# Patient Record
Sex: FEMALE | NOT HISPANIC OR LATINO | URBAN - METROPOLITAN AREA
[De-identification: names, ages, dates, MRNs, and addresses within clinical notes are randomized per-mention and may not be internally consistent; named-entity substitution may affect disease eponyms.]

---

## 2019-03-23 ENCOUNTER — INPATIENT (INPATIENT)
Facility: HOSPITAL | Age: 64
LOS: 6 days | Discharge: HOME | End: 2019-03-30
Attending: INTERNAL MEDICINE | Admitting: INTERNAL MEDICINE
Payer: MEDICAID

## 2019-03-23 VITALS — WEIGHT: 117.07 LBS | HEIGHT: 64 IN

## 2019-03-23 DIAGNOSIS — D72.828 OTHER ELEVATED WHITE BLOOD CELL COUNT: ICD-10-CM

## 2019-03-23 DIAGNOSIS — E44.0 MODERATE PROTEIN-CALORIE MALNUTRITION: ICD-10-CM

## 2019-03-23 DIAGNOSIS — R74.0 NONSPECIFIC ELEVATION OF LEVELS OF TRANSAMINASE AND LACTIC ACID DEHYDROGENASE [LDH]: ICD-10-CM

## 2019-03-23 DIAGNOSIS — Z98.891 HISTORY OF UTERINE SCAR FROM PREVIOUS SURGERY: Chronic | ICD-10-CM

## 2019-03-23 DIAGNOSIS — C18.9 MALIGNANT NEOPLASM OF COLON, UNSPECIFIED: ICD-10-CM

## 2019-03-23 LAB
ALBUMIN SERPL ELPH-MCNC: 2.5 G/DL — LOW (ref 3.3–5)
ALP SERPL-CCNC: 320 U/L — HIGH (ref 40–120)
ALT FLD-CCNC: 48 U/L — SIGNIFICANT CHANGE UP (ref 12–78)
ANION GAP SERPL CALC-SCNC: 9 MMOL/L — SIGNIFICANT CHANGE UP (ref 5–17)
APPEARANCE UR: CLEAR — SIGNIFICANT CHANGE UP
APTT BLD: 27.5 SEC — SIGNIFICANT CHANGE UP (ref 27.5–36.3)
AST SERPL-CCNC: 220 U/L — HIGH (ref 15–37)
BACTERIA # UR AUTO: ABNORMAL
BASOPHILS # BLD AUTO: 0.04 K/UL — SIGNIFICANT CHANGE UP (ref 0–0.2)
BASOPHILS NFR BLD AUTO: 0.3 % — SIGNIFICANT CHANGE UP (ref 0–2)
BILIRUB SERPL-MCNC: 0.7 MG/DL — SIGNIFICANT CHANGE UP (ref 0.2–1.2)
BILIRUB UR-MCNC: NEGATIVE — SIGNIFICANT CHANGE UP
BUN SERPL-MCNC: 7 MG/DL — SIGNIFICANT CHANGE UP (ref 7–23)
CALCIUM SERPL-MCNC: 9.1 MG/DL — SIGNIFICANT CHANGE UP (ref 8.5–10.1)
CHLORIDE SERPL-SCNC: 99 MMOL/L — SIGNIFICANT CHANGE UP (ref 96–108)
CO2 SERPL-SCNC: 28 MMOL/L — SIGNIFICANT CHANGE UP (ref 22–31)
COLOR SPEC: YELLOW — SIGNIFICANT CHANGE UP
CREAT SERPL-MCNC: 0.52 MG/DL — SIGNIFICANT CHANGE UP (ref 0.5–1.3)
DIFF PNL FLD: ABNORMAL
EOSINOPHIL # BLD AUTO: 0.14 K/UL — SIGNIFICANT CHANGE UP (ref 0–0.5)
EOSINOPHIL NFR BLD AUTO: 1 % — SIGNIFICANT CHANGE UP (ref 0–6)
EPI CELLS # UR: NEGATIVE — SIGNIFICANT CHANGE UP
GLUCOSE SERPL-MCNC: 91 MG/DL — SIGNIFICANT CHANGE UP (ref 70–99)
GLUCOSE UR QL: NEGATIVE MG/DL — SIGNIFICANT CHANGE UP
HCT VFR BLD CALC: 46 % — HIGH (ref 34.5–45)
HGB BLD-MCNC: 14.8 G/DL — SIGNIFICANT CHANGE UP (ref 11.5–15.5)
IMM GRANULOCYTES NFR BLD AUTO: 0.5 % — SIGNIFICANT CHANGE UP (ref 0–1.5)
INR BLD: 1.11 RATIO — SIGNIFICANT CHANGE UP (ref 0.88–1.16)
KETONES UR-MCNC: ABNORMAL
LACTATE SERPL-SCNC: 1 MMOL/L — SIGNIFICANT CHANGE UP (ref 0.7–2)
LEUKOCYTE ESTERASE UR-ACNC: ABNORMAL
LYMPHOCYTES # BLD AUTO: 16.1 % — SIGNIFICANT CHANGE UP (ref 13–44)
LYMPHOCYTES # BLD AUTO: 2.29 K/UL — SIGNIFICANT CHANGE UP (ref 1–3.3)
MCHC RBC-ENTMCNC: 27.3 PG — SIGNIFICANT CHANGE UP (ref 27–34)
MCHC RBC-ENTMCNC: 32.2 GM/DL — SIGNIFICANT CHANGE UP (ref 32–36)
MCV RBC AUTO: 84.7 FL — SIGNIFICANT CHANGE UP (ref 80–100)
MONOCYTES # BLD AUTO: 1.33 K/UL — HIGH (ref 0–0.9)
MONOCYTES NFR BLD AUTO: 9.3 % — SIGNIFICANT CHANGE UP (ref 2–14)
NEUTROPHILS # BLD AUTO: 10.38 K/UL — HIGH (ref 1.8–7.4)
NEUTROPHILS NFR BLD AUTO: 72.8 % — SIGNIFICANT CHANGE UP (ref 43–77)
NITRITE UR-MCNC: NEGATIVE — SIGNIFICANT CHANGE UP
NRBC # BLD: 0 /100 WBCS — SIGNIFICANT CHANGE UP (ref 0–0)
PH UR: 6.5 — SIGNIFICANT CHANGE UP (ref 5–8)
PLATELET # BLD AUTO: 615 K/UL — HIGH (ref 150–400)
POTASSIUM SERPL-MCNC: 4.5 MMOL/L — SIGNIFICANT CHANGE UP (ref 3.5–5.3)
POTASSIUM SERPL-SCNC: 4.5 MMOL/L — SIGNIFICANT CHANGE UP (ref 3.5–5.3)
PROT SERPL-MCNC: 7.1 GM/DL — SIGNIFICANT CHANGE UP (ref 6–8.3)
PROT UR-MCNC: 15 MG/DL
PROTHROM AB SERPL-ACNC: 12.4 SEC — SIGNIFICANT CHANGE UP (ref 10–12.9)
RBC # BLD: 5.43 M/UL — HIGH (ref 3.8–5.2)
RBC # FLD: 14 % — SIGNIFICANT CHANGE UP (ref 10.3–14.5)
RBC CASTS # UR COMP ASSIST: NEGATIVE /HPF — SIGNIFICANT CHANGE UP (ref 0–4)
SODIUM SERPL-SCNC: 136 MMOL/L — SIGNIFICANT CHANGE UP (ref 135–145)
SP GR SPEC: 1.01 — SIGNIFICANT CHANGE UP (ref 1.01–1.02)
UROBILINOGEN FLD QL: 4 MG/DL
WBC # BLD: 14.25 K/UL — HIGH (ref 3.8–10.5)
WBC # FLD AUTO: 14.25 K/UL — HIGH (ref 3.8–10.5)
WBC UR QL: NEGATIVE — SIGNIFICANT CHANGE UP

## 2019-03-23 PROCEDURE — 74177 CT ABD & PELVIS W/CONTRAST: CPT | Mod: 26

## 2019-03-23 PROCEDURE — 99285 EMERGENCY DEPT VISIT HI MDM: CPT

## 2019-03-23 PROCEDURE — 76705 ECHO EXAM OF ABDOMEN: CPT | Mod: 26

## 2019-03-23 RX ORDER — LACTOBACILLUS ACIDOPHILUS 100MM CELL
1 CAPSULE ORAL DAILY
Qty: 0 | Refills: 0 | Status: DISCONTINUED | OUTPATIENT
Start: 2019-03-23 | End: 2019-03-30

## 2019-03-23 RX ORDER — PIPERACILLIN AND TAZOBACTAM 4; .5 G/20ML; G/20ML
3.38 INJECTION, POWDER, LYOPHILIZED, FOR SOLUTION INTRAVENOUS ONCE
Qty: 0 | Refills: 0 | Status: COMPLETED | OUTPATIENT
Start: 2019-03-23 | End: 2019-03-23

## 2019-03-23 RX ORDER — OXYCODONE HYDROCHLORIDE 5 MG/1
5 TABLET ORAL EVERY 6 HOURS
Qty: 0 | Refills: 0 | Status: DISCONTINUED | OUTPATIENT
Start: 2019-03-23 | End: 2019-03-27

## 2019-03-23 RX ORDER — PIPERACILLIN AND TAZOBACTAM 4; .5 G/20ML; G/20ML
3.38 INJECTION, POWDER, LYOPHILIZED, FOR SOLUTION INTRAVENOUS EVERY 8 HOURS
Qty: 0 | Refills: 0 | Status: DISCONTINUED | OUTPATIENT
Start: 2019-03-23 | End: 2019-03-25

## 2019-03-23 RX ORDER — SODIUM CHLORIDE 9 MG/ML
3 INJECTION INTRAMUSCULAR; INTRAVENOUS; SUBCUTANEOUS ONCE
Qty: 0 | Refills: 0 | Status: COMPLETED | OUTPATIENT
Start: 2019-03-23 | End: 2019-03-23

## 2019-03-23 RX ORDER — LANOLIN ALCOHOL/MO/W.PET/CERES
3 CREAM (GRAM) TOPICAL AT BEDTIME
Qty: 0 | Refills: 0 | Status: DISCONTINUED | OUTPATIENT
Start: 2019-03-23 | End: 2019-03-30

## 2019-03-23 RX ORDER — INFLUENZA VIRUS VACCINE 15; 15; 15; 15 UG/.5ML; UG/.5ML; UG/.5ML; UG/.5ML
0.5 SUSPENSION INTRAMUSCULAR ONCE
Qty: 0 | Refills: 0 | Status: COMPLETED | OUTPATIENT
Start: 2019-03-23 | End: 2019-03-30

## 2019-03-23 RX ORDER — SODIUM CHLORIDE 9 MG/ML
1000 INJECTION INTRAMUSCULAR; INTRAVENOUS; SUBCUTANEOUS ONCE
Qty: 0 | Refills: 0 | Status: COMPLETED | OUTPATIENT
Start: 2019-03-23 | End: 2019-03-23

## 2019-03-23 RX ORDER — ENOXAPARIN SODIUM 100 MG/ML
40 INJECTION SUBCUTANEOUS EVERY 24 HOURS
Qty: 0 | Refills: 0 | Status: DISCONTINUED | OUTPATIENT
Start: 2019-03-23 | End: 2019-03-30

## 2019-03-23 RX ORDER — ACETAMINOPHEN 500 MG
650 TABLET ORAL EVERY 6 HOURS
Qty: 0 | Refills: 0 | Status: DISCONTINUED | OUTPATIENT
Start: 2019-03-23 | End: 2019-03-30

## 2019-03-23 RX ADMIN — PIPERACILLIN AND TAZOBACTAM 200 GRAM(S): 4; .5 INJECTION, POWDER, LYOPHILIZED, FOR SOLUTION INTRAVENOUS at 14:37

## 2019-03-23 RX ADMIN — Medication 3 MILLIGRAM(S): at 23:46

## 2019-03-23 RX ADMIN — SODIUM CHLORIDE 3 MILLILITER(S): 9 INJECTION INTRAMUSCULAR; INTRAVENOUS; SUBCUTANEOUS at 14:35

## 2019-03-23 RX ADMIN — ENOXAPARIN SODIUM 40 MILLIGRAM(S): 100 INJECTION SUBCUTANEOUS at 21:48

## 2019-03-23 RX ADMIN — SODIUM CHLORIDE 1000 MILLILITER(S): 9 INJECTION INTRAMUSCULAR; INTRAVENOUS; SUBCUTANEOUS at 14:38

## 2019-03-23 RX ADMIN — PIPERACILLIN AND TAZOBACTAM 25 GRAM(S): 4; .5 INJECTION, POWDER, LYOPHILIZED, FOR SOLUTION INTRAVENOUS at 21:48

## 2019-03-23 NOTE — H&P ADULT - HISTORY OF PRESENT ILLNESS
64 y/o female with no known PMHx that presents to the ED after being seen at [facility] for abdominal pain.  Patient states that she was told that her blood cultures were + and that she needed to come to the ED for evaluation.  Patients states that she has had intermittent dull and sharp 7/10 right upper quadrant pain for the last 6 weeks.  She states that she was seen in her native [place] ([facility]) for these symptoms and was diagnosed with rib fracture.    She states that she has also had nausea,  fever, and constipation over this period of time.  Patient states that she has had a 7-10 lb weight loss and inappetence over the last month.  She states that her fever is intermittent and nocturnal.  She complains of chills accompanying her abdominal pain.      ED course:  CT abdomen/pelvis demonstrate colonic mass, and multiple liver lesions. WBC 17-14

## 2019-03-23 NOTE — H&P ADULT - NSHPPHYSICALEXAM_GEN_ALL_CORE
ICU Vital Signs Last 24 Hrs  T(C): 36.9 (23 Mar 2019 13:20), Max: 36.9 (23 Mar 2019 13:20)  T(F): 98.4 (23 Mar 2019 13:20), Max: 98.4 (23 Mar 2019 13:20)  HR: 88 (23 Mar 2019 13:20) (88 - 88)  BP: 119/73 (23 Mar 2019 13:20) (119/73 - 119/73)  RR: 16 (23 Mar 2019 13:20) (16 - 16)  SpO2: 97% (23 Mar 2019 13:20) (97% - 97%)

## 2019-03-23 NOTE — ED STATDOCS - OBJECTIVE STATEMENT
62 y/o female with no pertinent PMHx presents to the ED regarding positive blood cultures. Pt was seen at urgent care, received phone call for +BC and to come to ED for evaluation. Pt went to  c/o a few weeks of diffuse abd pain with nausea and fever. Daughter states that pt has been having some difficulty taking a deep breath as well. Denies diarrhea but was also constipated. No PMD at this time. HPI translated as per daughter at bedside.

## 2019-03-23 NOTE — H&P ADULT - PROBLEM SELECTOR PLAN 1
- repeat blood cultures obtained and following results  - follow am cbc   - c/w Zosyn Q 8 hours  - ID eval if blood cultures +  -

## 2019-03-23 NOTE — ED ADULT NURSE NOTE - CHPI ED NUR SYMPTOMS NEG
no weakness/no dizziness/no pain/no decreased eating/drinking/no vomiting/no fever/no nausea/no chills/no tingling

## 2019-03-23 NOTE — ED STATDOCS - CLINICAL SUMMARY MEDICAL DECISION MAKING FREE TEXT BOX
Labs with elevated LFTs, CT a/p shows Enlarged nodular liver with innumerable masses highly suspicious for metastatic disease. Mass like area with wall thickening and luminal narrowing of the proximal descending colon/splenic flexure suspicious for colon carcinoma. Right lower lobe masses likely metastatic.  Plan to admit to hospitalist for further work up. Outpatient labs with leukocytosis, and elevated liver enzymes, initial concern was biliary infection, and patient was given antibiotics, and US, CT scan to further eval.      Labs with elevated LFTs, CT a/p shows Enlarged nodular liver with innumerable masses highly suspicious for metastatic disease. Mass like area with wall thickening and luminal narrowing of the proximal descending colon/splenic flexure suspicious for colon carcinoma. Right lower lobe masses likely metastatic.  Plan to admit to hospitalist for further work up.

## 2019-03-23 NOTE — ED ADULT TRIAGE NOTE - CHIEF COMPLAINT QUOTE
Pt states she was seen at urgent care, received phone call for +BC and to come to ED for evaluation. Pt went to UC for a week of and pain with nausea and fever.

## 2019-03-23 NOTE — H&P ADULT - ASSESSMENT
62 y/o female with abdominal pain    IMPROVE VTE Individual Risk Assessment          RISK                                                          Points    [  ] Previous VTE                                                3    [  ] Thrombophilia                                             2    [  ] Lower limb paralysis                                    2        (unable to hold up >15 seconds)      [ x ] Current Cancer                                             2         (within 6 months)    [  ] Immobilization > 24 hrs                              1    [  ] ICU/CCU stay > 24 hours                            1    [ x ] Age > 60                                                    1    IMPROVE VTE Score 3

## 2019-03-23 NOTE — ED STATDOCS - PROGRESS NOTE DETAILS
Patient seen and evaluated, ED attending note and orders reviewed, will continue with patient follow up and care -Dory Flannery PA-C Labs with WBC 14,, Alk phos 320.  CT a/p shows Enlarged nodular liver with innumerable masses highly suspicious for metastatic disease. Mass like area with wall thickening and luminal narrowing of the proximal descending colon/splenic flexure suspicious for colon carcinoma. Right lower lobe masses likely metastatic. Small pericardial effusion.  Pt lives half the year here and half in [place], has no PMD, is a daily smoker, and has never had a colonoscopy.  Plan to admit pt to medicine for oncological work up and further evaluation.  Pt and daughter agreeable to being admitted and plan of care, all questions answered  Dory Flannery PA-C

## 2019-03-24 LAB
BASOPHILS # BLD AUTO: 0.04 K/UL — SIGNIFICANT CHANGE UP (ref 0–0.2)
BASOPHILS NFR BLD AUTO: 0.3 % — SIGNIFICANT CHANGE UP (ref 0–2)
CULTURE RESULTS: SIGNIFICANT CHANGE UP
EOSINOPHIL # BLD AUTO: 0.13 K/UL — SIGNIFICANT CHANGE UP (ref 0–0.5)
EOSINOPHIL NFR BLD AUTO: 0.9 % — SIGNIFICANT CHANGE UP (ref 0–6)
HCT VFR BLD CALC: 39.5 % — SIGNIFICANT CHANGE UP (ref 34.5–45)
HCV AB S/CO SERPL IA: 0.12 S/CO — SIGNIFICANT CHANGE UP (ref 0–0.79)
HCV AB SERPL-IMP: SIGNIFICANT CHANGE UP
HGB BLD-MCNC: 12.9 G/DL — SIGNIFICANT CHANGE UP (ref 11.5–15.5)
IMM GRANULOCYTES NFR BLD AUTO: 0.5 % — SIGNIFICANT CHANGE UP (ref 0–1.5)
LYMPHOCYTES # BLD AUTO: 18.4 % — SIGNIFICANT CHANGE UP (ref 13–44)
LYMPHOCYTES # BLD AUTO: 2.62 K/UL — SIGNIFICANT CHANGE UP (ref 1–3.3)
MCHC RBC-ENTMCNC: 27.5 PG — SIGNIFICANT CHANGE UP (ref 27–34)
MCHC RBC-ENTMCNC: 32.7 GM/DL — SIGNIFICANT CHANGE UP (ref 32–36)
MCV RBC AUTO: 84.2 FL — SIGNIFICANT CHANGE UP (ref 80–100)
MONOCYTES # BLD AUTO: 1.25 K/UL — HIGH (ref 0–0.9)
MONOCYTES NFR BLD AUTO: 8.8 % — SIGNIFICANT CHANGE UP (ref 2–14)
NEUTROPHILS # BLD AUTO: 10.11 K/UL — HIGH (ref 1.8–7.4)
NEUTROPHILS NFR BLD AUTO: 71.1 % — SIGNIFICANT CHANGE UP (ref 43–77)
NRBC # BLD: 0 /100 WBCS — SIGNIFICANT CHANGE UP (ref 0–0)
PLATELET # BLD AUTO: 535 K/UL — HIGH (ref 150–400)
RBC # BLD: 4.69 M/UL — SIGNIFICANT CHANGE UP (ref 3.8–5.2)
RBC # FLD: 14 % — SIGNIFICANT CHANGE UP (ref 10.3–14.5)
SPECIMEN SOURCE: SIGNIFICANT CHANGE UP
WBC # BLD: 14.22 K/UL — HIGH (ref 3.8–10.5)
WBC # FLD AUTO: 14.22 K/UL — HIGH (ref 3.8–10.5)

## 2019-03-24 RX ORDER — ONDANSETRON 8 MG/1
4 TABLET, FILM COATED ORAL ONCE
Qty: 0 | Refills: 0 | Status: COMPLETED | OUTPATIENT
Start: 2019-03-24 | End: 2019-03-24

## 2019-03-24 RX ORDER — METOCLOPRAMIDE HCL 10 MG
10 TABLET ORAL ONCE
Qty: 0 | Refills: 0 | Status: COMPLETED | OUTPATIENT
Start: 2019-03-24 | End: 2019-03-24

## 2019-03-24 RX ORDER — SOD SULF/SODIUM/NAHCO3/KCL/PEG
2000 SOLUTION, RECONSTITUTED, ORAL ORAL ONCE
Qty: 0 | Refills: 0 | Status: COMPLETED | OUTPATIENT
Start: 2019-03-24 | End: 2019-03-24

## 2019-03-24 RX ORDER — ALPRAZOLAM 0.25 MG
0.25 TABLET ORAL ONCE
Qty: 0 | Refills: 0 | Status: DISCONTINUED | OUTPATIENT
Start: 2019-03-24 | End: 2019-03-24

## 2019-03-24 RX ADMIN — PIPERACILLIN AND TAZOBACTAM 25 GRAM(S): 4; .5 INJECTION, POWDER, LYOPHILIZED, FOR SOLUTION INTRAVENOUS at 14:19

## 2019-03-24 RX ADMIN — Medication 10 MILLIGRAM(S): at 23:38

## 2019-03-24 RX ADMIN — PIPERACILLIN AND TAZOBACTAM 25 GRAM(S): 4; .5 INJECTION, POWDER, LYOPHILIZED, FOR SOLUTION INTRAVENOUS at 05:40

## 2019-03-24 RX ADMIN — Medication 2000 MILLILITER(S): at 18:01

## 2019-03-24 RX ADMIN — Medication 1 TABLET(S): at 11:39

## 2019-03-24 RX ADMIN — ENOXAPARIN SODIUM 40 MILLIGRAM(S): 100 INJECTION SUBCUTANEOUS at 21:15

## 2019-03-24 RX ADMIN — PIPERACILLIN AND TAZOBACTAM 25 GRAM(S): 4; .5 INJECTION, POWDER, LYOPHILIZED, FOR SOLUTION INTRAVENOUS at 21:15

## 2019-03-24 RX ADMIN — Medication 0.25 MILLIGRAM(S): at 05:40

## 2019-03-24 RX ADMIN — ONDANSETRON 4 MILLIGRAM(S): 8 TABLET, FILM COATED ORAL at 21:15

## 2019-03-24 NOTE — DIETITIAN INITIAL EVALUATION ADULT. - ENERGY NEEDS
Ht.   64   "        Wt. 117   #              BMI  20.1                IBW  125  #               Pt is at  94  %  IBW

## 2019-03-24 NOTE — CONSULT NOTE ADULT - SUBJECTIVE AND OBJECTIVE BOX
This is a 63-year-old woman presenting to the hospital after being called from urgent care with abnormal labs.  Per her admission notes she was called for positive blood cultures, but the patient tells me that she was called for elevated liver tests.  She has not been feeling well for several months and completed nausea and abdominal pain for the past 3-5 months.  She has been constipated for several weeks having difficulty eating due to poor appetite as well.  She has never had a colonoscopy.  She presented to urgent care and had blood work that showed an elevated white count and liver function tests.  She presented here and CAT scan was done showing innumerable liver metastases and a lung lesion, as well as the descending colon mass.    PAST MEDICAL & SURGICAL HISTORY:  No pertinent past medical history  H/O:     MEDICATIONS  (STANDING):  enoxaparin Injectable 40 milliGRAM(s) SubCutaneous every 24 hours  influenza   Vaccine 0.5 milliLiter(s) IntraMuscular once  lactobacillus acidophilus 1 Tablet(s) Oral daily  melatonin 3 milliGRAM(s) Oral at bedtime  piperacillin/tazobactam IVPB. 3.375 Gram(s) IV Intermittent every 8 hours    Allergies    No Known Allergies    Intolerances    FAMILY HISTORY:  FH: stomach cancer: Cousin diagnosed with Stomach cancer    Soc:   1 ppd smoker  no EtOH    ROS: otherwise negative, all systems reviewed    PHYSICAL EXAM:  Vital Signs Last 24 Hrs  T(C): 36.7 (24 Mar 2019 14:36), Max: 36.7 (24 Mar 2019 14:36)  T(F): 98.1 (24 Mar 2019 14:36), Max: 98.1 (24 Mar 2019 14:36)  HR: 96 (24 Mar 2019 14:36) (96 - 98)  BP: 111/64 (24 Mar 2019 14:36) (111/64 - 119/50)  BP(mean): --  RR: 17 (24 Mar 2019 14:36) (17 - 18)  SpO2: 97% (24 Mar 2019 14:36) (95% - 97%)    Constitutional: No acute distress, alert and oriented ×3  sister at bedside    Eyes: Anicteric    Neck: Supple, no JVD    Respiratory: Clear to auscultation bilaterally, breathing comfortably    Cardiovascular: Regular rate and rhythm, no murmur    Gastrointestinal: Soft. RUQ and epigastric tenderness. nondistended, bowel sounds present.  No hepatosplenomegaly.    Extremities: Warm, well-perfused, no edema    Neurological: Grossly intact, no focal deficits    Skin: No lesion or rash    Lymph Nodes: No cervical or supraclavicular lymphadenopathy    Psychiatric: Mood and affect seem normal                          12.9   14.22 )-----------( 535      ( 24 Mar 2019 05:48 )             39.5         136  |  99  |  7   ----------------------------<  91  4.5   |  28  |  0.52    Ca    9.1      23 Mar 2019 13:53    TPro  7.1  /  Alb  2.5<L>  /  TBili  0.7  /  DBili  x   /  AST  220<H>  /  ALT  48  /  AlkPhos  320<H>      CT report and images reviewed in PACS  see HPI for details

## 2019-03-24 NOTE — CHART NOTE - NSCHARTNOTEFT_GEN_A_CORE
Called by nurse, episode of emesis x1    Will order Zofran x1 now.  Plan for colonoscopy in AM to be discussed with GI

## 2019-03-24 NOTE — DIETITIAN INITIAL EVALUATION ADULT. - PHYSICAL APPEARANCE
BMI 20.1 NFPE indicates : severe muscle wasting: Clavicle and deltoid.  Moderate muscle wasting: scapula, temporal. Moderate fat loss: Ocular and ribs.

## 2019-03-24 NOTE — DIETITIAN INITIAL EVALUATION ADULT. - NS AS NUTRI DX NUTRIENT
Malnutrition/Pt meets criteria for severe protein/calorie malnutrition in context of chronic illness secondary to significant weight loss, poor po intake <75% x > 1 month, moderate/severe muscle/fat wasting.

## 2019-03-24 NOTE — DIETITIAN INITIAL EVALUATION ADULT. - PERTINENT LABORATORY DATA
03-23 Na136 mmol/L Glu 91 mg/dL K+ 4.5 mmol/L Cr  0.52 mg/dL BUN 7 mg/dL Phos n/a   Alb 2.5 g/dL<L> PAB n/a

## 2019-03-24 NOTE — DIETITIAN INITIAL EVALUATION ADULT. - OTHER INFO
Nutrition consult for unintentional weight loss > 10%. Pt is a 62yo female admitted with colon cancer mets to liver. R/O blood cultures for +microorganisms. No significant medical history noted. Pt reports # with significant weight loss x 1 month (Current wt 117#/ 6.4% loss of UBW). Pt stated certain food smells turn her off to the food and she has taken some protein enriched foods out of her diet (ie: eggs). Pt willing to reintroduce these foods back into diet and trial them again. PTA- x 6 weeks pt stated had nausea/poor intake and consitipation. Pt consumes homemade healthshakes at home x 1 daily for additional calories (primarily made up of fruit and dairy). Breakfast recall: Toast w/ butter and jelly, tea (100% consumed however no protein enriched foods noted). Skin intact with no edema documented. Zhou score=21. GI consult pending for further evaluation (+) consitipation. BMI 20.1 NFPE indicates : severe muscle wasting: Clavicle and deltoid.  Moderate muscle wasting: scapula, temporal. Moderate fat loss: Ocular and ribs. Pt meets criteria for severe protein/calorie malnutrition in context of chronic illness secondary to significant weight loss, poor po intake <75% x > 1 month, moderate/severe muscle/fat wasting. Recommendations: 1) continue with regular diet and encourage small snacks throughout the day (protein/carb enriched). 2) additional protein/calorie supplements Ensure enlive 8oz po BID and Gelatein Plus enriched protein jello x 1 daily (20gm protein). 3) monitor weights weekly. 3) monitor labs/lytes and replete as needed. 4) if appetite does not improve consider appetite enhancing medication for optimal intake and reduce risk of further wt loss.

## 2019-03-24 NOTE — DIETITIAN INITIAL EVALUATION ADULT. - PERTINENT MEDS FT
MEDICATIONS  (STANDING):  enoxaparin Injectable 40 milliGRAM(s) SubCutaneous every 24 hours  influenza   Vaccine 0.5 milliLiter(s) IntraMuscular once  lactobacillus acidophilus 1 Tablet(s) Oral daily  melatonin 3 milliGRAM(s) Oral at bedtime  piperacillin/tazobactam IVPB. 3.375 Gram(s) IV Intermittent every 8 hours    MEDICATIONS  (PRN):  acetaminophen   Tablet .. 650 milliGRAM(s) Oral every 6 hours PRN Temp greater or equal to 38C (100.4F), Mild Pain (1 - 3)  oxyCODONE    IR 5 milliGRAM(s) Oral every 6 hours PRN Severe Pain (7 - 10)

## 2019-03-24 NOTE — CHART NOTE - NSCHARTNOTEFT_GEN_A_CORE
Upon Nutritional Assessment by the Registered Dietitian your patient was determined to meet criteria / has evidence of the following diagnosis/diagnoses:          [ ]  Mild Protein Calorie Malnutrition        [ ]  Moderate Protein Calorie Malnutrition        [x ] Severe Protein Calorie Malnutrition        [ ] Unspecified Protein Calorie Malnutrition        [ ] Underweight / BMI <19        [ ] Morbid Obesity / BMI > 40      Findings as based on:  •  Comprehensive nutrition assessment and consultation  •  Calorie counts (nutrient intake analysis)  •  Food acceptance and intake status from observations by staff  •  Follow up  •  Patient education  •  Intervention secondary to interdisciplinary rounds  •   concerns      Treatment:    1) continue with regular diet and encourage small snacks throughout the day (protein/carb enriched).   2) additional protein/calorie supplements Ensure enlive 8oz po BID and Gelatein Plus enriched protein jello x 1 daily (20gm protein).   3) monitor weights weekly. 3) monitor labs/lytes and replete as needed.   4) if appetite does not improve consider appetite enhancing medication for optimal intake and reduce risk of further wt loss.    The following diet has been recommended:    Continue with regular diet     PROVIDER Section:     By signing this assessment you are acknowledging and agree with the diagnosis/diagnoses assigned by the Registered Dietitian    Comments:

## 2019-03-24 NOTE — CONSULT NOTE ADULT - ASSESSMENT
63-year-old woman with the descending colon mass on CT, and extensive tumor burden in the liver.  Likely metastatic colon cancer.    recommendations:  -She will need a colonoscopy with biopsy, we will schedule this tomorrow  -Risk benefits and alternatives were discussed with the patient and her sister, all questions were answered  -Bowel prep written for today  -nothing by mouth after midnight except for remaining bowel prep  -oncology consultation  -DVT prophylaxis    Thank you for the courtesy of this referral.

## 2019-03-25 LAB
HCT VFR BLD CALC: 38 % — SIGNIFICANT CHANGE UP (ref 34.5–45)
HGB BLD-MCNC: 12.6 G/DL — SIGNIFICANT CHANGE UP (ref 11.5–15.5)
MCHC RBC-ENTMCNC: 27.9 PG — SIGNIFICANT CHANGE UP (ref 27–34)
MCHC RBC-ENTMCNC: 33.2 GM/DL — SIGNIFICANT CHANGE UP (ref 32–36)
MCV RBC AUTO: 84.1 FL — SIGNIFICANT CHANGE UP (ref 80–100)
NRBC # BLD: 0 /100 WBCS — SIGNIFICANT CHANGE UP (ref 0–0)
PLATELET # BLD AUTO: 494 K/UL — HIGH (ref 150–400)
RBC # BLD: 4.52 M/UL — SIGNIFICANT CHANGE UP (ref 3.8–5.2)
RBC # FLD: 13.9 % — SIGNIFICANT CHANGE UP (ref 10.3–14.5)
WBC # BLD: 14.82 K/UL — HIGH (ref 3.8–10.5)
WBC # FLD AUTO: 14.82 K/UL — HIGH (ref 3.8–10.5)

## 2019-03-25 PROCEDURE — 74018 RADEX ABDOMEN 1 VIEW: CPT | Mod: 26

## 2019-03-25 RX ORDER — ONDANSETRON 8 MG/1
4 TABLET, FILM COATED ORAL EVERY 6 HOURS
Qty: 0 | Refills: 0 | Status: DISCONTINUED | OUTPATIENT
Start: 2019-03-25 | End: 2019-03-30

## 2019-03-25 RX ORDER — SODIUM CHLORIDE 9 MG/ML
1000 INJECTION INTRAMUSCULAR; INTRAVENOUS; SUBCUTANEOUS
Qty: 0 | Refills: 0 | Status: DISCONTINUED | OUTPATIENT
Start: 2019-03-25 | End: 2019-03-26

## 2019-03-25 RX ORDER — MORPHINE SULFATE 50 MG/1
1 CAPSULE, EXTENDED RELEASE ORAL ONCE
Qty: 0 | Refills: 0 | Status: DISCONTINUED | OUTPATIENT
Start: 2019-03-25 | End: 2019-03-25

## 2019-03-25 RX ORDER — POLYETHYLENE GLYCOL 3350 17 G/17G
255 POWDER, FOR SOLUTION ORAL ONCE
Qty: 0 | Refills: 0 | Status: COMPLETED | OUTPATIENT
Start: 2019-03-25 | End: 2019-03-25

## 2019-03-25 RX ORDER — ONDANSETRON 8 MG/1
4 TABLET, FILM COATED ORAL ONCE
Qty: 0 | Refills: 0 | Status: COMPLETED | OUTPATIENT
Start: 2019-03-25 | End: 2019-03-25

## 2019-03-25 RX ADMIN — SODIUM CHLORIDE 70 MILLILITER(S): 9 INJECTION INTRAMUSCULAR; INTRAVENOUS; SUBCUTANEOUS at 12:44

## 2019-03-25 RX ADMIN — Medication 10 MILLIGRAM(S): at 18:45

## 2019-03-25 RX ADMIN — POLYETHYLENE GLYCOL 3350 255 GRAM(S): 17 POWDER, FOR SOLUTION ORAL at 20:14

## 2019-03-25 RX ADMIN — MORPHINE SULFATE 1 MILLIGRAM(S): 50 CAPSULE, EXTENDED RELEASE ORAL at 00:11

## 2019-03-25 RX ADMIN — ENOXAPARIN SODIUM 40 MILLIGRAM(S): 100 INJECTION SUBCUTANEOUS at 21:56

## 2019-03-25 RX ADMIN — ONDANSETRON 4 MILLIGRAM(S): 8 TABLET, FILM COATED ORAL at 06:46

## 2019-03-25 RX ADMIN — Medication 10 MILLIGRAM(S): at 14:53

## 2019-03-25 RX ADMIN — PIPERACILLIN AND TAZOBACTAM 25 GRAM(S): 4; .5 INJECTION, POWDER, LYOPHILIZED, FOR SOLUTION INTRAVENOUS at 14:55

## 2019-03-25 RX ADMIN — ONDANSETRON 4 MILLIGRAM(S): 8 TABLET, FILM COATED ORAL at 18:45

## 2019-03-25 RX ADMIN — PIPERACILLIN AND TAZOBACTAM 25 GRAM(S): 4; .5 INJECTION, POWDER, LYOPHILIZED, FOR SOLUTION INTRAVENOUS at 06:35

## 2019-03-25 RX ADMIN — Medication 3 MILLIGRAM(S): at 21:56

## 2019-03-25 NOTE — PROGRESS NOTE ADULT - SUBJECTIVE AND OBJECTIVE BOX
GI    didn't araceli bowel prep due to vomiting  no clinical changes today  several family at bedside    ROS: otherwise negative, all systems reviewed    PHYSICAL EXAM:  Vital Signs Last 24 Hrs  T(C): 36.7 (25 Mar 2019 13:27), Max: 36.8 (25 Mar 2019 04:30)  T(F): 98 (25 Mar 2019 13:27), Max: 98.2 (25 Mar 2019 04:30)  HR: 92 (25 Mar 2019 13:27) (92 - 105)  BP: 113/66 (25 Mar 2019 13:27) (112/70 - 135/72)  BP(mean): --  RR: 16 (25 Mar 2019 13:27) (16 - 17)  SpO2: 96% (25 Mar 2019 13:27) (96% - 96%)    Constitutional: No acute distress, alert and oriented ×3  dtr and sister at bedside    Eyes: Anicteric    Neck: Supple, no JVD    Respiratory: Clear to auscultation bilaterally, breathing comfortably    Cardiovascular: Regular rate and rhythm, no murmur    Gastrointestinal: Soft. RUQ and epigastric tenderness. nondistended, bowel sounds present.  No hepatosplenomegaly.    Extremities: Warm, well-perfused, no edema    Neurological: Grossly intact, no focal deficits    Skin: No lesion or rash    Lymph Nodes: No cervical or supraclavicular lymphadenopathy    Psychiatric: Mood and affect seem normal             AXR reviewed-no bowel obstruction

## 2019-03-25 NOTE — PROGRESS NOTE ADULT - SUBJECTIVE AND OBJECTIVE BOX
HOSPITALIST ATTENDING PROGRESS NOTE    Chart and meds reviewed.  Patient seen and examined.    HPI: 64 y/o female with no known PMHx that presents to the ED after being seen at [facility] for abdominal pain.  Patient states that she was told that her blood cultures were + and that she needed to come to the ED for evaluation.  Patients states that she has had intermittent dull and sharp 7/10 right upper quadrant pain for the last 6 weeks.  She states that she was [place] for these symptoms and was diagnosed with rib fracture.    She states that she has also had nausea,  fever, and constipation over this period of time.  Patient states that she has had a 7-10 lb weight loss and inappetence over the last month.  She states that her fever is intermittent and nocturnal.  She complains of chills accompanying her abdominal pain.      3/25/19 pt seen and examined, [facility]  used this AM, daughter at bedside as well, patient not seen a physician in the a long time in [facility]. She lives 6 months in  and 6 months [place]. Lost 8-10 lbs in the past 2 months, has abd pain, nausea. Patient with no BM after bowel prep. passing flatus.       All 10 systems reviewed and found to be negative with the exception of what has been described above.    MEDICATIONS  (STANDING):  enoxaparin Injectable 40 milliGRAM(s) SubCutaneous every 24 hours  influenza   Vaccine 0.5 milliLiter(s) IntraMuscular once  lactobacillus acidophilus 1 Tablet(s) Oral daily  melatonin 3 milliGRAM(s) Oral at bedtime  piperacillin/tazobactam IVPB. 3.375 Gram(s) IV Intermittent every 8 hours  sodium chloride 0.9%. 1000 milliLiter(s) (70 mL/Hr) IV Continuous <Continuous>    MEDICATIONS  (PRN):  acetaminophen   Tablet .. 650 milliGRAM(s) Oral every 6 hours PRN Temp greater or equal to 38C (100.4F), Mild Pain (1 - 3)  dicyclomine 10 milliGRAM(s) Oral two times a day before meals PRN cramping  ondansetron Injectable 4 milliGRAM(s) IV Push every 6 hours PRN Nausea and/or Vomiting  oxyCODONE    IR 5 milliGRAM(s) Oral every 6 hours PRN Severe Pain (7 - 10)      VITALS:  T(F): 98 (03-25-19 @ 13:27), Max: 98.2 (03-25-19 @ 04:30)  HR: 92 (03-25-19 @ 13:27) (92 - 105)  BP: 113/66 (03-25-19 @ 13:27) (112/70 - 135/72)  RR: 16 (03-25-19 @ 13:27) (16 - 17)  SpO2: 96% (03-25-19 @ 13:27) (96% - 96%)  Wt(kg): --    I&O's Summary      CAPILLARY BLOOD GLUCOSE          PHYSICAL EXAM:    HEENT:  pupils equal and reactive, EOMI, no oropharyngeal lesions, erythema, exudates, oral thrush  NECK:   supple, no carotid bruits, no palpable lymph nodes, no thyromegaly  CV:  +S1, +S2, regular, no murmurs or rubs  RESP:   lungs clear to auscultation bilaterally, no wheezing, rales, rhonchi, good air entry bilaterally  BREAST:  not examined  GI:  abdomen soft, non-tender, non-distended, normal BS, no bruits, no abdominal masses, no palpable masses  RECTAL:  not examined  :  not examined  MSK:   normal muscle tone, no atrophy, no rigidity, no contractions  EXT:  no clubbing, no cyanosis, no edema, no calf pain, swelling or erythema  VASCULAR:  pulses equal and symmetric in the upper and lower extremities  NEURO:  AAOX3, no focal neurological deficits, follows all commands, able to move extremities spontaneously  SKIN:  no ulcers, lesions or rashes    LABS:                            12.6   14.82 )-----------( 494      ( 25 Mar 2019 05:51 )             38.0                                                   CULTURES:

## 2019-03-26 LAB
ALBUMIN SERPL ELPH-MCNC: 2.3 G/DL — LOW (ref 3.3–5)
ALP SERPL-CCNC: 261 U/L — HIGH (ref 40–120)
ALT FLD-CCNC: 37 U/L — SIGNIFICANT CHANGE UP (ref 12–78)
ANION GAP SERPL CALC-SCNC: 9 MMOL/L — SIGNIFICANT CHANGE UP (ref 5–17)
AST SERPL-CCNC: 143 U/L — HIGH (ref 15–37)
BILIRUB SERPL-MCNC: 0.4 MG/DL — SIGNIFICANT CHANGE UP (ref 0.2–1.2)
BUN SERPL-MCNC: 7 MG/DL — SIGNIFICANT CHANGE UP (ref 7–23)
CALCIUM SERPL-MCNC: 8.4 MG/DL — LOW (ref 8.5–10.1)
CHLORIDE SERPL-SCNC: 98 MMOL/L — SIGNIFICANT CHANGE UP (ref 96–108)
CO2 SERPL-SCNC: 28 MMOL/L — SIGNIFICANT CHANGE UP (ref 22–31)
CREAT SERPL-MCNC: 0.36 MG/DL — LOW (ref 0.5–1.3)
GLUCOSE SERPL-MCNC: 123 MG/DL — HIGH (ref 70–99)
HCT VFR BLD CALC: 39.1 % — SIGNIFICANT CHANGE UP (ref 34.5–45)
HGB BLD-MCNC: 12.6 G/DL — SIGNIFICANT CHANGE UP (ref 11.5–15.5)
MCHC RBC-ENTMCNC: 27.2 PG — SIGNIFICANT CHANGE UP (ref 27–34)
MCHC RBC-ENTMCNC: 32.2 GM/DL — SIGNIFICANT CHANGE UP (ref 32–36)
MCV RBC AUTO: 84.4 FL — SIGNIFICANT CHANGE UP (ref 80–100)
NRBC # BLD: 0 /100 WBCS — SIGNIFICANT CHANGE UP (ref 0–0)
PLATELET # BLD AUTO: 596 K/UL — HIGH (ref 150–400)
POTASSIUM SERPL-MCNC: 3 MMOL/L — LOW (ref 3.5–5.3)
POTASSIUM SERPL-MCNC: 3.3 MMOL/L — LOW (ref 3.5–5.3)
POTASSIUM SERPL-SCNC: 3 MMOL/L — LOW (ref 3.5–5.3)
POTASSIUM SERPL-SCNC: 3.3 MMOL/L — LOW (ref 3.5–5.3)
PROT SERPL-MCNC: 6.2 GM/DL — SIGNIFICANT CHANGE UP (ref 6–8.3)
RBC # BLD: 4.63 M/UL — SIGNIFICANT CHANGE UP (ref 3.8–5.2)
RBC # FLD: 14 % — SIGNIFICANT CHANGE UP (ref 10.3–14.5)
SODIUM SERPL-SCNC: 135 MMOL/L — SIGNIFICANT CHANGE UP (ref 135–145)
WBC # BLD: 17.22 K/UL — HIGH (ref 3.8–10.5)
WBC # FLD AUTO: 17.22 K/UL — HIGH (ref 3.8–10.5)

## 2019-03-26 PROCEDURE — 88305 TISSUE EXAM BY PATHOLOGIST: CPT | Mod: 26

## 2019-03-26 PROCEDURE — 88341 IMHCHEM/IMCYTCHM EA ADD ANTB: CPT | Mod: 26

## 2019-03-26 PROCEDURE — 88342 IMHCHEM/IMCYTCHM 1ST ANTB: CPT | Mod: 26

## 2019-03-26 RX ORDER — POTASSIUM CHLORIDE 20 MEQ
10 PACKET (EA) ORAL
Qty: 0 | Refills: 0 | Status: COMPLETED | OUTPATIENT
Start: 2019-03-26 | End: 2019-03-26

## 2019-03-26 RX ORDER — DEXTROSE MONOHYDRATE, SODIUM CHLORIDE, AND POTASSIUM CHLORIDE 50; .745; 4.5 G/1000ML; G/1000ML; G/1000ML
1000 INJECTION, SOLUTION INTRAVENOUS
Qty: 0 | Refills: 0 | Status: DISCONTINUED | OUTPATIENT
Start: 2019-03-26 | End: 2019-03-27

## 2019-03-26 RX ADMIN — OXYCODONE HYDROCHLORIDE 5 MILLIGRAM(S): 5 TABLET ORAL at 20:38

## 2019-03-26 RX ADMIN — Medication 100 MILLIEQUIVALENT(S): at 19:00

## 2019-03-26 RX ADMIN — SODIUM CHLORIDE 70 MILLILITER(S): 9 INJECTION INTRAMUSCULAR; INTRAVENOUS; SUBCUTANEOUS at 05:56

## 2019-03-26 RX ADMIN — Medication 100 MILLIEQUIVALENT(S): at 13:23

## 2019-03-26 RX ADMIN — ONDANSETRON 4 MILLIGRAM(S): 8 TABLET, FILM COATED ORAL at 02:09

## 2019-03-26 RX ADMIN — OXYCODONE HYDROCHLORIDE 5 MILLIGRAM(S): 5 TABLET ORAL at 21:08

## 2019-03-26 RX ADMIN — ENOXAPARIN SODIUM 40 MILLIGRAM(S): 100 INJECTION SUBCUTANEOUS at 20:26

## 2019-03-26 RX ADMIN — Medication 3 MILLIGRAM(S): at 22:36

## 2019-03-26 RX ADMIN — DEXTROSE MONOHYDRATE, SODIUM CHLORIDE, AND POTASSIUM CHLORIDE 70 MILLILITER(S): 50; .745; 4.5 INJECTION, SOLUTION INTRAVENOUS at 22:35

## 2019-03-26 NOTE — PROVIDER CONTACT NOTE (OTHER) - ASSESSMENT
Was scanned and given with permission from Dr. Campo group to give zofran early at 10pm for patient. Worklist showing it was not given. Alerting medical team.

## 2019-03-26 NOTE — PROGRESS NOTE ADULT - SUBJECTIVE AND OBJECTIVE BOX
HOSPITALIST ATTENDING PROGRESS NOTE    Chart and meds reviewed.  Patient seen and examined.    HPI: 64 y/o female with no known PMHx that presents to the ED after being seen at [facility] for abdominal pain.  Patient states that she was told that her blood cultures were + and that she needed to come to the ED for evaluation.  Patients states that she has had intermittent dull and sharp 7/10 right upper quadrant pain for the last 6 weeks.  She states that [place] for these symptoms and was diagnosed with rib fracture.    She states that she has also had nausea,  fever, and constipation over this period of time.  Patient states that she has had a 7-10 lb weight loss and inappetence over the last month.  She states that her fever is intermittent and nocturnal.  She complains of chills accompanying her abdominal pain.      3/25/19 pt seen and examined, [place]  used this AM, daughter at bedside as well, patient not seen a physician in the a long time in [facility]. She lives 6 months in  and 6 months [place]. Lost 8-10 lbs in the past 2 months, has abd pain, nausea. Patient with no BM after bowel prep. passing flatus.     3/26/19 pt seen and examined, did have BM overnight, tolerated miralax prep, for colonoscopy today.    All 10 systems reviewed and found to be negative with the exception of what has been described above.    MEDICATIONS  (STANDING):  enoxaparin Injectable 40 milliGRAM(s) SubCutaneous every 24 hours  influenza   Vaccine 0.5 milliLiter(s) IntraMuscular once  lactobacillus acidophilus 1 Tablet(s) Oral daily  melatonin 3 milliGRAM(s) Oral at bedtime  potassium chloride  10 mEq/100 mL IVPB 10 milliEquivalent(s) IV Intermittent every 1 hour  sodium chloride 0.9% with potassium chloride 20 mEq/L 1000 milliLiter(s) (70 mL/Hr) IV Continuous <Continuous>    MEDICATIONS  (PRN):  acetaminophen   Tablet .. 650 milliGRAM(s) Oral every 6 hours PRN Temp greater or equal to 38C (100.4F), Mild Pain (1 - 3)  dicyclomine 10 milliGRAM(s) Oral two times a day before meals PRN cramping  ondansetron Injectable 4 milliGRAM(s) IV Push every 6 hours PRN Nausea and/or Vomiting  oxyCODONE    IR 5 milliGRAM(s) Oral every 6 hours PRN Severe Pain (7 - 10)      VITALS:  T(F): 98.1 (03-26-19 @ 12:58), Max: 98.3 (03-26-19 @ 05:34)  HR: 70 (03-26-19 @ 12:58) (70 - 94)  BP: 123/59 (03-26-19 @ 12:58) (117/56 - 123/70)  RR: 17 (03-26-19 @ 12:58) (16 - 18)  SpO2: 98% (03-26-19 @ 12:58) (97% - 98%)  Wt(kg): --    I&O's Summary    25 Mar 2019 07:01  -  26 Mar 2019 07:00  --------------------------------------------------------  IN: 1000 mL / OUT: 0 mL / NET: 1000 mL        CAPILLARY BLOOD GLUCOSE          PHYSICAL EXAM:    HEENT:  pupils equal and reactive, EOMI, no oropharyngeal lesions, erythema, exudates, oral thrush  NECK:   supple, no carotid bruits, no palpable lymph nodes, no thyromegaly  CV:  +S1, +S2, regular, no murmurs or rubs  RESP:   lungs clear to auscultation bilaterally, no wheezing, rales, rhonchi, good air entry bilaterally  BREAST:  not examined  GI:  abdomen soft, non-tender, non-distended, normal BS, no bruits, no abdominal masses, no palpable masses  RECTAL:  not examined  :  not examined  MSK:   normal muscle tone, no atrophy, no rigidity, no contractions  EXT:  no clubbing, no cyanosis, no edema, no calf pain, swelling or erythema  VASCULAR:  pulses equal and symmetric in the upper and lower extremities  NEURO:  AAOX3, no focal neurological deficits, follows all commands, able to move extremities spontaneously  SKIN:  no ulcers, lesions or rashes    LABS:                            12.6   17.22 )-----------( 596      ( 26 Mar 2019 05:48 )             39.1     03-26    135  |  98  |  7   ----------------------------<  123<H>  3.0<L>   |  28  |  0.36<L>    Ca    8.4<L>      26 Mar 2019 05:48    TPro  6.2  /  Alb  2.3<L>  /  TBili  0.4  /  DBili  x   /  AST  143<H>  /  ALT  37  /  AlkPhos  261<H>  03-26        LIVER FUNCTIONS - ( 26 Mar 2019 05:48 )  Alb: 2.3 g/dL / Pro: 6.2 gm/dL / ALK PHOS: 261 U/L / ALT: 37 U/L / AST: 143 U/L / GGT: x                                             CULTURES:

## 2019-03-27 LAB
ADD ON TEST-SPECIMEN IN LAB: SIGNIFICANT CHANGE UP
ALBUMIN SERPL ELPH-MCNC: 2.2 G/DL — LOW (ref 3.3–5)
ALP SERPL-CCNC: 230 U/L — HIGH (ref 40–120)
ALT FLD-CCNC: 38 U/L — SIGNIFICANT CHANGE UP (ref 12–78)
ANION GAP SERPL CALC-SCNC: 7 MMOL/L — SIGNIFICANT CHANGE UP (ref 5–17)
AST SERPL-CCNC: 144 U/L — HIGH (ref 15–37)
BILIRUB DIRECT SERPL-MCNC: 0.1 MG/DL — SIGNIFICANT CHANGE UP (ref 0–0.2)
BILIRUB INDIRECT FLD-MCNC: 0.3 MG/DL — SIGNIFICANT CHANGE UP (ref 0.2–1)
BILIRUB SERPL-MCNC: 0.4 MG/DL — SIGNIFICANT CHANGE UP (ref 0.2–1.2)
BUN SERPL-MCNC: 10 MG/DL — SIGNIFICANT CHANGE UP (ref 7–23)
CALCIUM SERPL-MCNC: 8.3 MG/DL — LOW (ref 8.5–10.1)
CHLORIDE SERPL-SCNC: 99 MMOL/L — SIGNIFICANT CHANGE UP (ref 96–108)
CO2 SERPL-SCNC: 28 MMOL/L — SIGNIFICANT CHANGE UP (ref 22–31)
CREAT SERPL-MCNC: 0.41 MG/DL — LOW (ref 0.5–1.3)
GLUCOSE SERPL-MCNC: 116 MG/DL — HIGH (ref 70–99)
HCT VFR BLD CALC: 39.6 % — SIGNIFICANT CHANGE UP (ref 34.5–45)
HGB BLD-MCNC: 13 G/DL — SIGNIFICANT CHANGE UP (ref 11.5–15.5)
MCHC RBC-ENTMCNC: 27.7 PG — SIGNIFICANT CHANGE UP (ref 27–34)
MCHC RBC-ENTMCNC: 32.8 GM/DL — SIGNIFICANT CHANGE UP (ref 32–36)
MCV RBC AUTO: 84.3 FL — SIGNIFICANT CHANGE UP (ref 80–100)
NRBC # BLD: 0 /100 WBCS — SIGNIFICANT CHANGE UP (ref 0–0)
PLATELET # BLD AUTO: 627 K/UL — HIGH (ref 150–400)
POTASSIUM SERPL-MCNC: 3.4 MMOL/L — LOW (ref 3.5–5.3)
POTASSIUM SERPL-SCNC: 3.4 MMOL/L — LOW (ref 3.5–5.3)
PROT SERPL-MCNC: 5.6 GM/DL — LOW (ref 6–8.3)
RBC # BLD: 4.7 M/UL — SIGNIFICANT CHANGE UP (ref 3.8–5.2)
RBC # FLD: 14 % — SIGNIFICANT CHANGE UP (ref 10.3–14.5)
SODIUM SERPL-SCNC: 134 MMOL/L — LOW (ref 135–145)
WBC # BLD: 20.44 K/UL — HIGH (ref 3.8–10.5)
WBC # FLD AUTO: 20.44 K/UL — HIGH (ref 3.8–10.5)

## 2019-03-27 PROCEDURE — 74019 RADEX ABDOMEN 2 VIEWS: CPT | Mod: 26

## 2019-03-27 RX ORDER — POTASSIUM CHLORIDE 20 MEQ
40 PACKET (EA) ORAL ONCE
Qty: 0 | Refills: 0 | Status: COMPLETED | OUTPATIENT
Start: 2019-03-27 | End: 2019-03-27

## 2019-03-27 RX ORDER — POTASSIUM CHLORIDE 20 MEQ
10 PACKET (EA) ORAL
Qty: 0 | Refills: 0 | Status: COMPLETED | OUTPATIENT
Start: 2019-03-27 | End: 2019-03-27

## 2019-03-27 RX ORDER — PIPERACILLIN AND TAZOBACTAM 4; .5 G/20ML; G/20ML
3.38 INJECTION, POWDER, LYOPHILIZED, FOR SOLUTION INTRAVENOUS EVERY 8 HOURS
Qty: 0 | Refills: 0 | Status: DISCONTINUED | OUTPATIENT
Start: 2019-03-27 | End: 2019-03-30

## 2019-03-27 RX ORDER — KETOROLAC TROMETHAMINE 30 MG/ML
15 SYRINGE (ML) INJECTION ONCE
Qty: 0 | Refills: 0 | Status: DISCONTINUED | OUTPATIENT
Start: 2019-03-27 | End: 2019-03-27

## 2019-03-27 RX ORDER — SIMETHICONE 80 MG/1
80 TABLET, CHEWABLE ORAL EVERY 12 HOURS
Qty: 0 | Refills: 0 | Status: DISCONTINUED | OUTPATIENT
Start: 2019-03-27 | End: 2019-03-30

## 2019-03-27 RX ORDER — ONDANSETRON 8 MG/1
4 TABLET, FILM COATED ORAL ONCE
Qty: 0 | Refills: 0 | Status: COMPLETED | OUTPATIENT
Start: 2019-03-27 | End: 2019-03-27

## 2019-03-27 RX ADMIN — ENOXAPARIN SODIUM 40 MILLIGRAM(S): 100 INJECTION SUBCUTANEOUS at 21:46

## 2019-03-27 RX ADMIN — Medication 100 MILLIEQUIVALENT(S): at 20:09

## 2019-03-27 RX ADMIN — OXYCODONE HYDROCHLORIDE 5 MILLIGRAM(S): 5 TABLET ORAL at 16:40

## 2019-03-27 RX ADMIN — SIMETHICONE 80 MILLIGRAM(S): 80 TABLET, CHEWABLE ORAL at 14:01

## 2019-03-27 RX ADMIN — ONDANSETRON 4 MILLIGRAM(S): 8 TABLET, FILM COATED ORAL at 01:58

## 2019-03-27 RX ADMIN — PIPERACILLIN AND TAZOBACTAM 25 GRAM(S): 4; .5 INJECTION, POWDER, LYOPHILIZED, FOR SOLUTION INTRAVENOUS at 15:34

## 2019-03-27 RX ADMIN — Medication 100 MILLIEQUIVALENT(S): at 21:47

## 2019-03-27 RX ADMIN — Medication 10 MILLIGRAM(S): at 19:33

## 2019-03-27 RX ADMIN — Medication 15 MILLIGRAM(S): at 21:56

## 2019-03-27 RX ADMIN — Medication 15 MILLIGRAM(S): at 22:14

## 2019-03-27 RX ADMIN — Medication 5 MILLIGRAM(S): at 21:47

## 2019-03-27 RX ADMIN — Medication 1 TABLET(S): at 12:12

## 2019-03-27 RX ADMIN — ONDANSETRON 4 MILLIGRAM(S): 8 TABLET, FILM COATED ORAL at 21:46

## 2019-03-27 RX ADMIN — OXYCODONE HYDROCHLORIDE 5 MILLIGRAM(S): 5 TABLET ORAL at 06:03

## 2019-03-27 NOTE — CONSULT NOTE ADULT - SUBJECTIVE AND OBJECTIVE BOX
Please review chart.   Refill request from 4/13/17 shows medication was approved 63 year old female admitted with findings of abnormal studies from urgent care and found to have a colon mass. She reports a few months history of intermittent abdominal pain, weight loss, anorexia and 3 week history of constipation. She was seen in urgent and instructed to come to hospital based on results. Since admission, she has had a CT scan which shows concern for metastatic disease to liver and lung as well as descending colon mass. She underwent a flex sigmoidoscopy which showed a partially obstructing colon mass. She reports abdominal distension and has not taken much PO today. No stool or flatus today although she did have some yesterday and did have several bowel movements with prep prior to colonoscopy    PAST MEDICAL & SURGICAL HISTORY:  No pertinent past medical history  H/O:     Family history: aunt and cousin with stomach cancer    Social history: Daily smoker, social alcohol use    Exam:  Vital Signs Last 24 Hrs  T(C): 36.6 (27 Mar 2019 13:35), Max: 36.8 (26 Mar 2019 20:21)  T(F): 97.8 (27 Mar 2019 13:35), Max: 98.3 (26 Mar 2019 20:21)  HR: 68 (27 Mar 2019 13:35) (68 - 81)  BP: 129/65 (27 Mar 2019 13:35) (117/79 - 133/65)  RR: 18 (27 Mar 2019 13:35) (17 - 18)  SpO2: 98% (27 Mar 2019 13:35) (97% - 98%)    In no distress, thin  Normocephalic, atraumatic  Non labored breathing on room air  Regular rate and rhythm  Abdomen soft, distended with +tympany, non tender  Skin is warm and dry  Moves extremities without difficulty  Alert and oriented x 3                          13.0   20.44 )-----------( 627      ( 27 Mar 2019 05:36 )             39.6     03-    134<L>  |  99  |  10  ----------------------------<  116<H>  3.4<L>   |  28  |  0.41<L>    Ca    8.3<L>      27 Mar 2019 05:36    TPro  5.6<L>  /  Alb  2.2<L>  /  TBili  0.4  /  DBili  0.1  /  AST  144<H>  /  ALT  38  /  AlkPhos  230<H>

## 2019-03-27 NOTE — CONSULT NOTE ADULT - ASSESSMENT
63 year old female with partially obstructing colon mass and multiple liver and lung lesions concerning for metastatic disease. Awaiting path. She is distended although soft and non tender but concern for progressing large bowel obstruction. X ray ordered for tomorrow. Favor stent placement if possible, otherwise will need a colostomy to relieve obstruction. Discussed with patient and her sister. She also has rising leukocytosis, so far no growth from blood cultures to date

## 2019-03-27 NOTE — PROGRESS NOTE ADULT - SUBJECTIVE AND OBJECTIVE BOX
HOSPITALIST ATTENDING PROGRESS NOTE    Chart and meds reviewed.  Patient seen and examined.    HPI: 62 y/o female with no known PMHx that presents to the ED after being seen at [facility] for abdominal pain.  Patient states that she was told that her blood cultures were + and that she needed to come to the ED for evaluation.  Patients states that she has had intermittent dull and sharp 7/10 right upper quadrant pain for the last 6 weeks.  She states that [place] these symptoms and was diagnosed with rib fracture.    She states that she has also had nausea,  fever, and constipation over this period of time.  Patient states that she has had a 7-10 lb weight loss and inappetence over the last month.  She states that her fever is intermittent and nocturnal.  She complains of chills accompanying her abdominal pain.      3/25/19 pt seen and examined, [facility]  used this AM, daughter at bedside as well, patient not seen a physician in the a long time in [facility]. She lives 6 months in  and 6 months [place]. Lost 8-10 lbs in the past 2 months, has abd pain, nausea. Patient with no BM after bowel prep. passing flatus.     3/26/19 pt seen and examined, did have BM overnight, tolerated miralax prep, for colonoscopy today.    3/27/19 pt seen and examined, had BM last night, having nausea/bloating with diet, d/w GI, may need stenting. WBC elevated.     All 10 systems reviewed and found to be negative with the exception of what has been described above.    MEDICATIONS  (STANDING):  enoxaparin Injectable 40 milliGRAM(s) SubCutaneous every 24 hours  influenza   Vaccine 0.5 milliLiter(s) IntraMuscular once  lactobacillus acidophilus 1 Tablet(s) Oral daily  melatonin 3 milliGRAM(s) Oral at bedtime  potassium chloride    Tablet ER 40 milliEquivalent(s) Oral once  simethicone 80 milliGRAM(s) Chew every 12 hours    MEDICATIONS  (PRN):  acetaminophen   Tablet .. 650 milliGRAM(s) Oral every 6 hours PRN Temp greater or equal to 38C (100.4F), Mild Pain (1 - 3)  dicyclomine 10 milliGRAM(s) Oral two times a day before meals PRN cramping  ondansetron Injectable 4 milliGRAM(s) IV Push every 6 hours PRN Nausea and/or Vomiting  oxyCODONE    IR 5 milliGRAM(s) Oral every 6 hours PRN Severe Pain (7 - 10)      VITALS:  T(F): 97.8 (03-27-19 @ 13:35), Max: 98.3 (03-26-19 @ 20:21)  HR: 68 (03-27-19 @ 13:35) (68 - 81)  BP: 129/65 (03-27-19 @ 13:35) (117/79 - 133/65)  RR: 18 (03-27-19 @ 13:35) (17 - 18)  SpO2: 98% (03-27-19 @ 13:35) (97% - 98%)  Wt(kg): --    I&O's Summary      CAPILLARY BLOOD GLUCOSE          PHYSICAL EXAM:    HEENT:  pupils equal and reactive, EOMI, no oropharyngeal lesions, erythema, exudates, oral thrush  NECK:   supple, no carotid bruits, no palpable lymph nodes, no thyromegaly  CV:  +S1, +S2, regular, no murmurs or rubs  RESP:   lungs clear to auscultation bilaterally, no wheezing, rales, rhonchi, good air entry bilaterally  BREAST:  not examined  GI:  abdomen soft, non-tender, non-distended, normal BS, no bruits, no abdominal masses, no palpable masses  RECTAL:  not examined  :  not examined  MSK:   normal muscle tone, no atrophy, no rigidity, no contractions  EXT:  no clubbing, no cyanosis, no edema, no calf pain, swelling or erythema  VASCULAR:  pulses equal and symmetric in the upper and lower extremities  NEURO:  AAOX3, no focal neurological deficits, follows all commands, able to move extremities spontaneously  SKIN:  no ulcers, lesions or rashes    LABS:                            13.0   20.44 )-----------( 627      ( 27 Mar 2019 05:36 )             39.6     03-27    134<L>  |  99  |  10  ----------------------------<  116<H>  3.4<L>   |  28  |  0.41<L>    Ca    8.3<L>      27 Mar 2019 05:36    TPro  5.6<L>  /  Alb  2.2<L>  /  TBili  0.4  /  DBili  0.1  /  AST  144<H>  /  ALT  38  /  AlkPhos  230<H>  03-27        LIVER FUNCTIONS - ( 27 Mar 2019 05:36 )  Alb: 2.2 g/dL / Pro: 5.6 gm/dL / ALK PHOS: 230 U/L / ALT: 38 U/L / AST: 144 U/L / GGT: x                                             CULTURES:

## 2019-03-27 NOTE — PROGRESS NOTE ADULT - SUBJECTIVE AND OBJECTIVE BOX
GI    not able to eat much due to nausea  abd pain persists  had small bm after flex sig yest  sister at bedside    ROS: otherwise negative, all systems reviewed    PHYSICAL EXAM:  Vital Signs Last 24 Hrs  T(C): 36.7 (27 Mar 2019 04:19), Max: 36.8 (26 Mar 2019 20:21)  T(F): 98.1 (27 Mar 2019 04:19), Max: 98.3 (26 Mar 2019 20:21)  HR: 77 (27 Mar 2019 04:19) (70 - 81)  BP: 133/65 (27 Mar 2019 04:19) (117/79 - 133/65)  BP(mean): --  RR: 18 (27 Mar 2019 04:19) (17 - 18)  SpO2: 97% (27 Mar 2019 04:19) (97% - 98%)    Constitutional: No acute distress, alert and oriented ×3  English-speaking sister at bedside    Eyes: Anicteric    Neck: Supple, no JVD    Respiratory: Clear to auscultation bilaterally, breathing comfortably    Cardiovascular: Regular rate and rhythm, no murmur    Gastrointestinal: RUQ and epigastric tenderness. mildly distended, bowel sounds present.     Extremities: Warm, well-perfused, no edema    Neurological: Grossly intact, no focal deficits    Skin: No lesion or rash    Lymph Nodes: No cervical or supraclavicular lymphadenopathy    Psychiatric: Mood and affect seem normal

## 2019-03-27 NOTE — CONSULT NOTE ADULT - ASSESSMENT
This is a 62 yo female with no prior history now found with obstructive colon lesion as well as radiographic suggestiion of hepatic metastases and pulmonary disease.     I had an extensive discussion with patient and sister about high likelihood of metastatic colon CA as a  primary disease. I would recommend continued observation as patient is not passing flatus or BM. I discussed and informed the patient and sister that systemic therapy would be the ideal therapy. Will need to await pathology for confirmation. Would need to assess CEA.       Thank you for allowing me to participate in Ms. Gibson's care.

## 2019-03-27 NOTE — CONSULT NOTE ADULT - SUBJECTIVE AND OBJECTIVE BOX
[facility]/ [facility] consult   HPI:  62 y/o female with no known PMHx that presents to the ED after being seen at [facility] for abdominal pain. patient noted to have increased abdominal pain and CT performed. CT suggested hepatic metastases and pulmonary metastases. patient underwent endoscopy which suggested obstruction. patient has not had flatus or BM.     ED course:  CT abdomen/pelvis demonstrate colonic mass, and multiple liver lesions. WBC 17-14 (23 Mar 2019 18:03)      Allergies    No Known Allergies    Intolerances        MEDICATIONS  (STANDING):  enoxaparin Injectable 40 milliGRAM(s) SubCutaneous every 24 hours  influenza   Vaccine 0.5 milliLiter(s) IntraMuscular once  lactobacillus acidophilus 1 Tablet(s) Oral daily  melatonin 3 milliGRAM(s) Oral at bedtime  piperacillin/tazobactam IVPB. 3.375 Gram(s) IV Intermittent every 8 hours  potassium chloride  10 mEq/100 mL IVPB 10 milliEquivalent(s) IV Intermittent every 1 hour  simethicone 80 milliGRAM(s) Chew every 12 hours    MEDICATIONS  (PRN):  acetaminophen   Tablet .. 650 milliGRAM(s) Oral every 6 hours PRN Temp greater or equal to 38C (100.4F), Mild Pain (1 - 3)  dicyclomine 10 milliGRAM(s) Oral two times a day before meals PRN cramping  ondansetron Injectable 4 milliGRAM(s) IV Push every 6 hours PRN Nausea and/or Vomiting  oxyCODONE    IR 5 milliGRAM(s) Oral every 6 hours PRN Severe Pain (7 - 10)      PAST MEDICAL & SURGICAL HISTORY:  No pertinent past medical history  H/O:       FAMILY HISTORY:  FH: stomach cancer: Cousin diagnosed with Stomach cancer      SOCIAL HISTORY: No EtOH, no tobacco      Todays's Evaluation:    GENERAL: NAD, well-developed  HEAD:  Atraumatic, Normocephalic  EYES: EOMI, PERRLA, conjunctiva and sclera clear  NECK: Supple, No JVD  CHEST/LUNG: Clear to auscultation bilaterally; No wheeze  HEART: Regular rate and rhythm; No murmurs, rubs, or gallops  ABDOMEN: Soft, distended Decreased bowel sounds  EXTREMITIES:  2+ Peripheral Pulses, No clubbing, cyanosis, or edema  NEUROLOGY: non-focal  SKIN: No rashes or lesions    Laboratories:                           13.0   20.44 )-----------( 627      ( 27 Mar 2019 05:36 )             39.6           134<L>  |  99  |  10  ----------------------------<  116<H>  3.4<L>   |  28  |  0.41<L>    Ca    8.3<L>      27 Mar 2019 05:36    TPro  5.6<L>  /  Alb  2.2<L>  /  TBili  0.4  /  DBili  0.1  /  AST  144<H>  /  ALT  38  /  AlkPhos  230<H>            Summary:    Plan:

## 2019-03-27 NOTE — CHART NOTE - NSCHARTNOTEFT_GEN_A_CORE
Pt c/o abdominal pain 8/10 and nausea.    Pt seen and examined. Pt states pain is generalized and intermittent. Pt has not had a bowel movement and has not passed flatus today.      Vital Signs Last 24 Hrs  T(C): 36.6 (27 Mar 2019 20:53), Max: 36.7 (27 Mar 2019 04:19)  T(F): 97.9 (27 Mar 2019 20:53), Max: 98.1 (27 Mar 2019 04:19)  HR: 65 (27 Mar 2019 20:53) (65 - 77)  BP: 113/63 (27 Mar 2019 20:53) (113/63 - 133/65)  BP(mean): --  RR: 18 (27 Mar 2019 20:53) (18 - 18)  SpO2: 97% (27 Mar 2019 20:53) (97% - 98%)    Physical  General- in mild distress  Chest- CTAB  CV- +S1/S2  Abdomen- +distended, +tender on LLQ, + BS, no rebound or guarding   Extremities- no edema      EXAM:  CT ABDOMEN AND PELVIS IC                            PROCEDURE DATE:  03/23/2019        IMPRESSION:    Enlarged nodular liver with innumerable masses highly suspicious for   metastatic disease.   Mass like area with wall thickening and luminal narrowing of the proximal  descending colon/splenic flexure suspicious for colon carcinoma.  Right lower lobe masses likely metastatic.  Small pericardial effusion  Results were discussed with Dr. Bender at 2:50 PM on March 23, 2019.      A/P-   64 yo female with  obstructive colon lesion and likely hepatic metastases and pulmonary disease.     #Abdominal XR STAT  -Zofran x1 STAT  -Monitor closely   -Dulcolax now  -Pain management with caution use of opioids Pt c/o abdominal pain 8/10 and nausea.    Pt seen and examined. Pt states pain is generalized and intermittent. Pt has not had a bowel movement and has not passed flatus today.      Vital Signs Last 24 Hrs  T(C): 36.6 (27 Mar 2019 20:53), Max: 36.7 (27 Mar 2019 04:19)  T(F): 97.9 (27 Mar 2019 20:53), Max: 98.1 (27 Mar 2019 04:19)  HR: 65 (27 Mar 2019 20:53) (65 - 77)  BP: 113/63 (27 Mar 2019 20:53) (113/63 - 133/65)  BP(mean): --  RR: 18 (27 Mar 2019 20:53) (18 - 18)  SpO2: 97% (27 Mar 2019 20:53) (97% - 98%)    Physical  General- in mild distress  Chest- CTAB  CV- +S1/S2  Abdomen- +distended, +tender on LLQ, + BS, no rebound or guarding   Extremities- no edema      EXAM:  CT ABDOMEN AND PELVIS IC                            PROCEDURE DATE:  03/23/2019        IMPRESSION:    Enlarged nodular liver with innumerable masses highly suspicious for   metastatic disease.   Mass like area with wall thickening and luminal narrowing of the proximal  descending colon/splenic flexure suspicious for colon carcinoma.  Right lower lobe masses likely metastatic.  Small pericardial effusion  Results were discussed with Dr. Bender at 2:50 PM on March 23, 2019.      A/P-   62 yo female with  obstructive colon lesion and likely hepatic metastases and pulmonary disease.     #Abdominal XR STAT  -Zofran x1 STAT  -Monitor closely   -Dulcolax now  -Pain management with caution use of opioids- Toradol x1

## 2019-03-27 NOTE — CHART NOTE - NSCHARTNOTEFT_GEN_A_CORE
Discussed XR abdomen and pelvis with Radiologist on call (Dr. Reynoso)  As per Radiologist non specific, no free air, consider CT A/P w/ IV or PO contrast if concerned for worsening obstruction.

## 2019-03-28 LAB
ALBUMIN SERPL ELPH-MCNC: 2.2 G/DL — LOW (ref 3.3–5)
ALP SERPL-CCNC: 221 U/L — HIGH (ref 40–120)
ALT FLD-CCNC: 39 U/L — SIGNIFICANT CHANGE UP (ref 12–78)
ANION GAP SERPL CALC-SCNC: 8 MMOL/L — SIGNIFICANT CHANGE UP (ref 5–17)
AST SERPL-CCNC: 177 U/L — HIGH (ref 15–37)
BILIRUB SERPL-MCNC: 0.5 MG/DL — SIGNIFICANT CHANGE UP (ref 0.2–1.2)
BUN SERPL-MCNC: 9 MG/DL — SIGNIFICANT CHANGE UP (ref 7–23)
CALCIUM SERPL-MCNC: 8 MG/DL — LOW (ref 8.5–10.1)
CHLORIDE SERPL-SCNC: 101 MMOL/L — SIGNIFICANT CHANGE UP (ref 96–108)
CO2 SERPL-SCNC: 29 MMOL/L — SIGNIFICANT CHANGE UP (ref 22–31)
CREAT SERPL-MCNC: 0.45 MG/DL — LOW (ref 0.5–1.3)
CULTURE RESULTS: SIGNIFICANT CHANGE UP
CULTURE RESULTS: SIGNIFICANT CHANGE UP
GLUCOSE SERPL-MCNC: 91 MG/DL — SIGNIFICANT CHANGE UP (ref 70–99)
HCT VFR BLD CALC: 39.4 % — SIGNIFICANT CHANGE UP (ref 34.5–45)
HGB BLD-MCNC: 13.2 G/DL — SIGNIFICANT CHANGE UP (ref 11.5–15.5)
MCHC RBC-ENTMCNC: 28 PG — SIGNIFICANT CHANGE UP (ref 27–34)
MCHC RBC-ENTMCNC: 33.5 GM/DL — SIGNIFICANT CHANGE UP (ref 32–36)
MCV RBC AUTO: 83.7 FL — SIGNIFICANT CHANGE UP (ref 80–100)
NRBC # BLD: 0 /100 WBCS — SIGNIFICANT CHANGE UP (ref 0–0)
PLATELET # BLD AUTO: 579 K/UL — HIGH (ref 150–400)
POTASSIUM SERPL-MCNC: 3.5 MMOL/L — SIGNIFICANT CHANGE UP (ref 3.5–5.3)
POTASSIUM SERPL-SCNC: 3.5 MMOL/L — SIGNIFICANT CHANGE UP (ref 3.5–5.3)
PROT SERPL-MCNC: 5.4 GM/DL — LOW (ref 6–8.3)
RBC # BLD: 4.71 M/UL — SIGNIFICANT CHANGE UP (ref 3.8–5.2)
RBC # FLD: 14.3 % — SIGNIFICANT CHANGE UP (ref 10.3–14.5)
SODIUM SERPL-SCNC: 138 MMOL/L — SIGNIFICANT CHANGE UP (ref 135–145)
SPECIMEN SOURCE: SIGNIFICANT CHANGE UP
SPECIMEN SOURCE: SIGNIFICANT CHANGE UP
WBC # BLD: 19.28 K/UL — HIGH (ref 3.8–10.5)
WBC # FLD AUTO: 19.28 K/UL — HIGH (ref 3.8–10.5)

## 2019-03-28 PROCEDURE — 74019 RADEX ABDOMEN 2 VIEWS: CPT | Mod: 26

## 2019-03-28 RX ADMIN — SIMETHICONE 80 MILLIGRAM(S): 80 TABLET, CHEWABLE ORAL at 17:08

## 2019-03-28 RX ADMIN — SIMETHICONE 80 MILLIGRAM(S): 80 TABLET, CHEWABLE ORAL at 06:17

## 2019-03-28 RX ADMIN — PIPERACILLIN AND TAZOBACTAM 25 GRAM(S): 4; .5 INJECTION, POWDER, LYOPHILIZED, FOR SOLUTION INTRAVENOUS at 21:15

## 2019-03-28 RX ADMIN — PIPERACILLIN AND TAZOBACTAM 25 GRAM(S): 4; .5 INJECTION, POWDER, LYOPHILIZED, FOR SOLUTION INTRAVENOUS at 14:23

## 2019-03-28 RX ADMIN — Medication 1 TABLET(S): at 11:00

## 2019-03-28 RX ADMIN — Medication 3 MILLIGRAM(S): at 21:14

## 2019-03-28 RX ADMIN — PIPERACILLIN AND TAZOBACTAM 25 GRAM(S): 4; .5 INJECTION, POWDER, LYOPHILIZED, FOR SOLUTION INTRAVENOUS at 06:18

## 2019-03-28 RX ADMIN — PIPERACILLIN AND TAZOBACTAM 25 GRAM(S): 4; .5 INJECTION, POWDER, LYOPHILIZED, FOR SOLUTION INTRAVENOUS at 00:05

## 2019-03-28 NOTE — PROGRESS NOTE ADULT - SUBJECTIVE AND OBJECTIVE BOX
GI    not able to eat much due to nausea  araceli clears, had small bm today  abd pain persists    ROS: otherwise negative, all systems reviewed    PHYSICAL EXAM:  Vital Signs Last 24 Hrs  T(C): 36.4 (28 Mar 2019 12:37), Max: 37.1 (28 Mar 2019 05:17)  T(F): 97.6 (28 Mar 2019 12:37), Max: 98.8 (28 Mar 2019 05:17)  HR: 67 (28 Mar 2019 12:37) (65 - 80)  BP: 127/62 (28 Mar 2019 12:37) (113/63 - 127/62)  BP(mean): --  RR: 16 (28 Mar 2019 12:37) (16 - 18)  SpO2: 98% (28 Mar 2019 12:37) (96% - 98%)    Constitutional: No acute distress, alert and oriented ×3    Eyes: Anicteric    Neck: Supple, no JVD    Respiratory: Clear to auscultation bilaterally, breathing comfortably    Cardiovascular: Regular rate and rhythm, no murmur    Gastrointestinal: RUQ and epigastric tenderness. mildly distended, bowel sounds present.     Extremities: Warm, well-perfused, no edema    Neurological: Grossly intact, no focal deficits    Skin: No lesion or rash    Lymph Nodes: No cervical or supraclavicular lymphadenopathy    Psychiatric: Mood and affect seem normal

## 2019-03-28 NOTE — CONSULT NOTE ADULT - SUBJECTIVE AND OBJECTIVE BOX
Patient is a 63y old  Female who presents with a chief complaint of sent in for + blood cultures (28 Mar 2019 11:10)    HPI:  64 y/o female with no known PMHx that presents to the ED after being seen at [facility] for abdominal pain. Patients states that she has had intermittent dull and sharp 7/10 right upper quadrant pain for the last 6 weeks. She states that she was seen in [place] for these symptoms and was diagnosed with rib fracture. She states that she has also had nausea,  fever, and constipation over this period of time. Patient states that she has had a 7-10 lb weight loss and inappetence over the last month.  She states that her fever is intermittent and nocturnal.  She complains of chills accompanying her abdominal pain. ED course:  CT abdomen/pelvis demonstrate colonic mass, and multiple liver lesions. WBC 17-14.      PMH: as above  PSH: as above  Meds: per reconciliation sheet, noted below  MEDICATIONS  (STANDING):  enoxaparin Injectable 40 milliGRAM(s) SubCutaneous every 24 hours  influenza   Vaccine 0.5 milliLiter(s) IntraMuscular once  lactobacillus acidophilus 1 Tablet(s) Oral daily  melatonin 3 milliGRAM(s) Oral at bedtime  piperacillin/tazobactam IVPB. 3.375 Gram(s) IV Intermittent every 8 hours  simethicone 80 milliGRAM(s) Chew every 12 hours    Allergies    No Known Allergies    Intolerances      Social: no smoking, no alcohol, no illegal drugs; no recent travel, no exposure to TB  FAMILY HISTORY:  FH: stomach cancer: Cousin diagnosed with Stomach cancer     no history of premature cardiovascular disease in first degree relatives    ROS: the patient denies fever, no chills, no HA, no dizziness, no sore throat, no blurry vision, no CP, no palpitations, no SOB, no cough, no diarrhea, no dysuria, no leg pain, no claudication, no rash, no joint aches, no rectal pain or bleeding, no night sweats  All other systems reviewed and are negative    Vital Signs Last 24 Hrs  T(C): 37.1 (28 Mar 2019 05:17), Max: 37.1 (28 Mar 2019 05:17)  T(F): 98.8 (28 Mar 2019 05:17), Max: 98.8 (28 Mar 2019 05:17)  HR: 80 (28 Mar 2019 05:17) (65 - 80)  BP: 125/66 (28 Mar 2019 05:17) (113/63 - 129/65)  BP(mean): --  RR: 18 (28 Mar 2019 05:17) (18 - 18)  SpO2: 96% (28 Mar 2019 05:17) (96% - 98%)  Daily     Daily     PE:  Constitutional: frail looking  HEENT: NC/AT, EOMI, PERRLA, conjunctivae clear; ears and nose atraumatic; pharynx benign  Neck: supple; thyroid not palpable  Back: no tenderness  Respiratory: respiratory effort normal; clear to auscultation  Cardiovascular: S1S2 regular, no murmurs  Abdomen: soft, not tender, distended, positive BS; liver and spleen WNL  Genitourinary: no suprapubic tenderness  Lymphatic: no LN palpable  Musculoskeletal: no muscle tenderness, no joint swelling or tenderness  Extremities: no pedal edema  Neurological/ Psychiatric:  moving all extremities  Skin: no rashes; no palpable lesions    Labs: all available labs reviewed                        13.2   19.28 )-----------( 579      ( 28 Mar 2019 05:43 )             39.4     03-28    138  |  101  |  9   ----------------------------<  91  3.5   |  29  |  0.45<L>    Ca    8.0<L>      28 Mar 2019 05:43    TPro  5.4<L>  /  Alb  2.2<L>  /  TBili  0.5  /  DBili  x   /  AST  177<H>  /  ALT  39  /  AlkPhos  221<H>  03-28     LIVER FUNCTIONS - ( 28 Mar 2019 05:43 )  Alb: 2.2 g/dL / Pro: 5.4 gm/dL / ALK PHOS: 221 U/L / ALT: 39 U/L / AST: 177 U/L / GGT: x           Culture - Urine (03.23.19 @ 14:06)    Specimen Source: .Urine Clean Catch (Midstream)    Culture Results:   <10,000 CFU/mL Normal Urogenital Josephine    Culture - Blood (03.23.19 @ 14:01)    Specimen Source: .Blood Blood-Peripheral    Culture Results:   No growth to date.    Culture - Blood (03.23.19 @ 13:53)    Specimen Source: .Blood Blood-Peripheral    Culture Results:   No growth to date.          Radiology: all available radiological tests reviewed  < from: CT Abdomen and Pelvis w/ IV Cont (03.23.19 @ 14:16) >  EXAM:  CT ABDOMEN AND PELVIS IC                            PROCEDURE DATE:  03/23/2019          INTERPRETATION:  CLINICAL STATEMENT: abdominal pain, guarding r/o   infection    TECHNIQUE: CT of the abdomen and pelvis was performed with IV contrast.  Oral contrast was administered. Approximately 90 cc of Omnipaque 350   administered.    COMPARISON: None.    FINDINGS:    The lower chest is remarkable for a right lower lobe mass measuring 2.1 x   2.0 cm. This has an irregular border. A satellite nodule is seen more   inferiorly in the right lower lobe measuring 9 mm on image 11 of series   2. There is a small pericardial effusion    The liver is remarkable for hepatic megaly and innumerable solid masses   in the right and left lobes with nodularity of the liver surface highly   suspicious for metastatic disease.. The fluid-filled gallbladder is   appreciated.    The spleen, pancreas and adrenal glands are unremarkable.The kidneys are   unremarkable. The bladder is not well evaluated as itis not distended.    There is no bowel obstruction. A normal appendix is seen. There is a mass   in the descending colon proximally with significant circumferential wall   thickening with luminal narrowing and streaking in the pericolonic fat.   Thisis seen on image 46 of series 2 in the axial plane and on image 35   of the coronal plane.    There is no intraperitoneal free air.  There is no free fluid. The aorta   is not aneurysmal. There is atherosclerotic calcification of the   abdominal aorta and its branches.    There is no significant abdominal, retroperitoneal or pelvic   lymphadenopathy. The pelvic structures demonstrate calcifications likely   within the uterus which may represent fibroids.    The osseous structures demonstrate degenerative changes. Bone island is   seen in the right femoral head.    IMPRESSION:    Enlarged nodular liver with innumerable masses highly suspicious for   metastatic disease.   Mass like area with wall thickening and luminal narrowing of the proximal  descending colon/splenic flexure suspicious for colon carcinoma.  Right lower lobe masses likely metastatic.  Small pericardial effusion  Results were discussed with Dr. Bender at 2:50 PM on March 23, 2019.    < end of copied text >    EXAM:  XR ABDOMEN 2V                            PROCEDURE DATE:  03/27/2019          INTERPRETATION:  Abdomen radiographs             CLINICAL INFORMATION:  Abdominal  Pain.    TECHNIQUE:  Supine and upright views of the abdomen were obtained.    FINDINGS:   3/25/2019 available for review.    The bowel gas pattern is unremarkable.  No pathologic calcifications are   seen.  The osseous structures appear intact.           IMPRESSION:  Nonspecific bowel gas pattern.             Advanced directives addressed: full resuscitation

## 2019-03-28 NOTE — PROGRESS NOTE ADULT - SUBJECTIVE AND OBJECTIVE BOX
Stable overnight. Had a bowel movement and passing flatus. Denies abdominal pain and feels less distended. No  nausea or emesis    Exam:  Vital Signs Last 24 Hrs  T(C): 37.1 (28 Mar 2019 05:17), Max: 37.1 (28 Mar 2019 05:17)  T(F): 98.8 (28 Mar 2019 05:17), Max: 98.8 (28 Mar 2019 05:17)  HR: 80 (28 Mar 2019 05:17) (65 - 80)  BP: 125/66 (28 Mar 2019 05:17) (113/63 - 129/65)  RR: 18 (28 Mar 2019 05:17) (18 - 18)  SpO2: 96% (28 Mar 2019 05:17) (96% - 98%)    In no distress  Non labored breathing  Abdomen soft, non tender, improved distension from yesterday  Alert and oriented x 3    03-28    138  |  101  |  9   ----------------------------<  91  3.5   |  29  |  0.45<L>    Ca    8.0<L>      28 Mar 2019 05:43    TPro  5.4<L>  /  Alb  2.2<L>  /  TBili  0.5  /  DBili  x   /  AST  177<H>  /  ALT  39  /  AlkPhos  221<H>  03-28                          13.2   19.28 )-----------( 579      ( 28 Mar 2019 05:43 )             39.4   Abdominal x ray with non specific gas pattern and one air filled loop with air fluid level in LUQ    MEDICATIONS  (STANDING):  enoxaparin Injectable 40 milliGRAM(s) SubCutaneous every 24 hours  influenza   Vaccine 0.5 milliLiter(s) IntraMuscular once  lactobacillus acidophilus 1 Tablet(s) Oral daily  melatonin 3 milliGRAM(s) Oral at bedtime  piperacillin/tazobactam IVPB. 3.375 Gram(s) IV Intermittent every 8 hours  simethicone 80 milliGRAM(s) Chew every 12 hours

## 2019-03-28 NOTE — PROGRESS NOTE ADULT - SUBJECTIVE AND OBJECTIVE BOX
INTERVAL HISTORY:  No BM, patient denies any abdominal pain , no nausea  REVIEW OF SYSTEMS:      Allergies    No Known Allergies    Intolerances        MEDICATIONS  (STANDING):  enoxaparin Injectable 40 milliGRAM(s) SubCutaneous every 24 hours  influenza   Vaccine 0.5 milliLiter(s) IntraMuscular once  lactobacillus acidophilus 1 Tablet(s) Oral daily  melatonin 3 milliGRAM(s) Oral at bedtime  piperacillin/tazobactam IVPB. 3.375 Gram(s) IV Intermittent every 8 hours  simethicone 80 milliGRAM(s) Chew every 12 hours    MEDICATIONS  (PRN):  acetaminophen   Tablet .. 650 milliGRAM(s) Oral every 6 hours PRN Temp greater or equal to 38C (100.4F), Mild Pain (1 - 3)  dicyclomine 10 milliGRAM(s) Oral two times a day before meals PRN cramping  ondansetron Injectable 4 milliGRAM(s) IV Push every 6 hours PRN Nausea and/or Vomiting  oxyCODONE    IR 5 milliGRAM(s) Oral every 6 hours PRN Severe Pain (7 - 10)      Vital Signs Last 24 Hrs  T(C): 36.5 (28 Mar 2019 21:56), Max: 37.1 (28 Mar 2019 05:17)  T(F): 97.7 (28 Mar 2019 21:56), Max: 98.8 (28 Mar 2019 05:17)  HR: 75 (28 Mar 2019 21:56) (67 - 80)  BP: 122/59 (28 Mar 2019 21:56) (122/59 - 127/62)  BP(mean): --  RR: 17 (28 Mar 2019 21:56) (16 - 18)  SpO2: 97% (28 Mar 2019 21:56) (96% - 98%)    PHYSICAL EXAM:    GENERAL: NAD,   HEAD:  Atraumatic, Normocephalic  EYES: EOMI, PERRLA, conjunctiva and sclera clear    NECK: Supple, No JVD, Normal thyroid  NERVOUS SYSTEM:    CHEST/LUNG: Clear to percussion bilaterally; No rales, rhonchi,   HEART: Regular rate and rhythm;   ABDOMEN: Soft, Nontender. diminished bowel sounds  EXTREMITIES:   edema:- none  LYMPH: No lymphadenopathy noted        LABS:                        13.2   19.28 )-----------( 579      ( 28 Mar 2019 05:43 )             39.4     03-28    138  |  101  |  9   ----------------------------<  91  3.5   |  29  |  0.45<L>    Ca    8.0<L>      28 Mar 2019 05:43    TPro  5.4<L>  /  Alb  2.2<L>  /  TBili  0.5  /  DBili  x   /  AST  177<H>  /  ALT  39  /  AlkPhos  221<H>  03-28            RADIOLOGY & ADDITIONAL STUDIES:    PATHOLOGY:

## 2019-03-28 NOTE — CONSULT NOTE ADULT - ASSESSMENT
62 y/o female with no known PMHx that presents to the ED after being seen at [facility] for abdominal pain. Patients states that she has had intermittent dull and sharp 7/10 right upper quadrant pain for the last 6 weeks. She states that [place] and was diagnosed with rib fracture. She states that she has also had nausea,  fever, and constipation over this period of time. Patient states that she has had a 7-10 lb weight loss and inappetence over the last month.  She states that her fever is intermittent and nocturnal.  She complains of chills accompanying her abdominal pain. ED course:  CT abdomen/pelvis demonstrate colonic mass, and multiple liver lesions. WBC 17-14.    63 year old female with partially obstructing colon mass and multiple liver and lung lesions concerning for metastatic disease. Awaiting path. Improved distension today and had bowel movement. Plan is for colonic stent tomorrow. Surgery for diversion indicated only if becomes obstructed and stent cannot be placed. Still has leukocytosis but no localized infection. On IV abx    1. leukocytosis. obstructing colon mass. suspected metastatic colon ca w/ liver-lung lesions  - CRS eval noted, plan for colonic stent tomorrow   - no obvious source of infection  - blood cx no growth, no diarrhea, had BM this am  - no rashes   - wbc ct likely reactive  - on empiric zosyn [id number] 3/23-3/25, restarted 3/27  - suggest observe off antibiotics   - f/u cbc   - consider LE doppler      2. other issues - care per medicine

## 2019-03-28 NOTE — PROGRESS NOTE ADULT - SUBJECTIVE AND OBJECTIVE BOX
HOSPITALIST ATTENDING PROGRESS NOTE    Chart and meds reviewed.  Patient seen and examined.    HPI: 62 y/o female with no known PMHx that presents to the ED after being seen at [facility] for abdominal pain.  Patient states that she was told that her blood cultures were + and that she needed to come to the ED for evaluation.  Patients states that she has had intermittent dull and sharp 7/10 right upper quadrant pain for the last 6 weeks.  She states that [place] these symptoms and was diagnosed with rib fracture.    She states that she has also had nausea,  fever, and constipation over this period of time.  Patient states that she has had a 7-10 lb weight loss and inappetence over the last month.  She states that her fever is intermittent and nocturnal.  She complains of chills accompanying her abdominal pain.      3/25/19 pt seen and examined, [place]  used this AM, daughter at bedside as well, patient not seen a physician in the a long time in [facility]. She lives 6 months in  and 6 months [place]. Lost 8-10 lbs in the past 2 months, has abd pain, nausea. Patient with no BM after bowel prep. passing flatus.     3/26/19 pt seen and examined, did have BM overnight, tolerated miralax prep, for colonoscopy today.    3/27/19 pt seen and examined, had BM last night, having nausea/bloating with diet, d/w GI, may need stenting. WBC elevated.     3/28/19 pt seen and examined, feels a little better today, d/w GI and colorectal Sx.       All 10 systems reviewed and found to be negative with the exception of what has been described above.    MEDICATIONS  (STANDING):  enoxaparin Injectable 40 milliGRAM(s) SubCutaneous every 24 hours  influenza   Vaccine 0.5 milliLiter(s) IntraMuscular once  lactobacillus acidophilus 1 Tablet(s) Oral daily  melatonin 3 milliGRAM(s) Oral at bedtime  piperacillin/tazobactam IVPB. 3.375 Gram(s) IV Intermittent every 8 hours  simethicone 80 milliGRAM(s) Chew every 12 hours    MEDICATIONS  (PRN):  acetaminophen   Tablet .. 650 milliGRAM(s) Oral every 6 hours PRN Temp greater or equal to 38C (100.4F), Mild Pain (1 - 3)  dicyclomine 10 milliGRAM(s) Oral two times a day before meals PRN cramping  ondansetron Injectable 4 milliGRAM(s) IV Push every 6 hours PRN Nausea and/or Vomiting  oxyCODONE    IR 5 milliGRAM(s) Oral every 6 hours PRN Severe Pain (7 - 10)      VITALS:  T(F): 97.6 (03-28-19 @ 12:37), Max: 98.8 (03-28-19 @ 05:17)  HR: 67 (03-28-19 @ 12:37) (65 - 80)  BP: 127/62 (03-28-19 @ 12:37) (113/63 - 127/62)  RR: 16 (03-28-19 @ 12:37) (16 - 18)  SpO2: 98% (03-28-19 @ 12:37) (96% - 98%)  Wt(kg): --    I&O's Summary    28 Mar 2019 07:01  -  28 Mar 2019 16:04  --------------------------------------------------------  IN: 120 mL / OUT: 0 mL / NET: 120 mL        CAPILLARY BLOOD GLUCOSE          PHYSICAL EXAM:    HEENT:  pupils equal and reactive, EOMI, no oropharyngeal lesions, erythema, exudates, oral thrush  NECK:   supple, no carotid bruits, no palpable lymph nodes, no thyromegaly  CV:  +S1, +S2, regular, no murmurs or rubs  RESP:   lungs clear to auscultation bilaterally, no wheezing, rales, rhonchi, good air entry bilaterally  BREAST:  not examined  GI:  abdomen soft, non-tender, non-distended, normal BS, no bruits, no abdominal masses, no palpable masses  RECTAL:  not examined  :  not examined  MSK:   normal muscle tone, no atrophy, no rigidity, no contractions  EXT:  no clubbing, no cyanosis, no edema, no calf pain, swelling or erythema  VASCULAR:  pulses equal and symmetric in the upper and lower extremities  NEURO:  AAOX3, no focal neurological deficits, follows all commands, able to move extremities spontaneously  SKIN:  no ulcers, lesions or rashes    LABS:                            13.2   19.28 )-----------( 579      ( 28 Mar 2019 05:43 )             39.4     03-28    138  |  101  |  9   ----------------------------<  91  3.5   |  29  |  0.45<L>    Ca    8.0<L>      28 Mar 2019 05:43    TPro  5.4<L>  /  Alb  2.2<L>  /  TBili  0.5  /  DBili  x   /  AST  177<H>  /  ALT  39  /  AlkPhos  221<H>  03-28        LIVER FUNCTIONS - ( 28 Mar 2019 05:43 )  Alb: 2.2 g/dL / Pro: 5.4 gm/dL / ALK PHOS: 221 U/L / ALT: 39 U/L / AST: 177 U/L / GGT: x                                             CULTURES: HOSPITALIST ATTENDING PROGRESS NOTE    Chart and meds reviewed.  Patient seen and examined.    HPI: 64 y/o female with no known PMHx that [facility] ED [facility].  Patient states that she was told that her blood cultures were + and that she needed to come to the ED for evaluation.  Patients states that she has had intermittent dull and sharp 7/10 right upper quadrant pain for the last 6 weeks.  She states that she was [place] for these symptoms and was diagnosed with rib fracture.    She states that she has also had nausea,  fever, and constipation over this period of time.  Patient states that she has had a 7-10 lb weight loss and inappetence over the last month.  She states that her fever is intermittent and nocturnal.  She complains of chills accompanying her abdominal pain.      3/25/19 pt seen and examined, [place]  used this AM, daughter at bedside as well, patient not seen a physician in the a long time in [facility]. She lives 6 months in  and 6 months [place]. Lost 8-10 lbs in the past 2 months, has abd pain, nausea. Patient with no BM after bowel prep. passing flatus.     3/26/19 pt seen and examined, did have BM overnight, tolerated miralax prep, for colonoscopy today.    3/27/19 [place]  used,  pt seen and examined, had BM last night, having nausea/bloating with diet, d/w GI, may need stenting. WBC elevated.     3/28/19 [place]  used ID [id number] pt seen and examined, feels a little better today, d/w GI and colorectal Sx.       All 10 systems reviewed and found to be negative with the exception of what has been described above.    MEDICATIONS  (STANDING):  enoxaparin Injectable 40 milliGRAM(s) SubCutaneous every 24 hours  influenza   Vaccine 0.5 milliLiter(s) IntraMuscular once  lactobacillus acidophilus 1 Tablet(s) Oral daily  melatonin 3 milliGRAM(s) Oral at bedtime  piperacillin/tazobactam IVPB. 3.375 Gram(s) IV Intermittent every 8 hours  simethicone 80 milliGRAM(s) Chew every 12 hours    MEDICATIONS  (PRN):  acetaminophen   Tablet .. 650 milliGRAM(s) Oral every 6 hours PRN Temp greater or equal to 38C (100.4F), Mild Pain (1 - 3)  dicyclomine 10 milliGRAM(s) Oral two times a day before meals PRN cramping  ondansetron Injectable 4 milliGRAM(s) IV Push every 6 hours PRN Nausea and/or Vomiting  oxyCODONE    IR 5 milliGRAM(s) Oral every 6 hours PRN Severe Pain (7 - 10)      VITALS:  T(F): 97.6 (03-28-19 @ 12:37), Max: 98.8 (03-28-19 @ 05:17)  HR: 67 (03-28-19 @ 12:37) (65 - 80)  BP: 127/62 (03-28-19 @ 12:37) (113/63 - 127/62)  RR: 16 (03-28-19 @ 12:37) (16 - 18)  SpO2: 98% (03-28-19 @ 12:37) (96% - 98%)  Wt(kg): --    I&O's Summary    28 Mar 2019 07:01  -  28 Mar 2019 16:04  --------------------------------------------------------  IN: 120 mL / OUT: 0 mL / NET: 120 mL        CAPILLARY BLOOD GLUCOSE          PHYSICAL EXAM:    HEENT:  pupils equal and reactive, EOMI, no oropharyngeal lesions, erythema, exudates, oral thrush  NECK:   supple, no carotid bruits, no palpable lymph nodes, no thyromegaly  CV:  +S1, +S2, regular, no murmurs or rubs  RESP:   lungs clear to auscultation bilaterally, no wheezing, rales, rhonchi, good air entry bilaterally  BREAST:  not examined  GI:  abdomen soft, non-tender, non-distended, normal BS, no bruits, no abdominal masses, no palpable masses  RECTAL:  not examined  :  not examined  MSK:   normal muscle tone, no atrophy, no rigidity, no contractions  EXT:  no clubbing, no cyanosis, no edema, no calf pain, swelling or erythema  VASCULAR:  pulses equal and symmetric in the upper and lower extremities  NEURO:  AAOX3, no focal neurological deficits, follows all commands, able to move extremities spontaneously  SKIN:  no ulcers, lesions or rashes    LABS:                            13.2   19.28 )-----------( 579      ( 28 Mar 2019 05:43 )             39.4     03-28    138  |  101  |  9   ----------------------------<  91  3.5   |  29  |  0.45<L>    Ca    8.0<L>      28 Mar 2019 05:43    TPro  5.4<L>  /  Alb  2.2<L>  /  TBili  0.5  /  DBili  x   /  AST  177<H>  /  ALT  39  /  AlkPhos  221<H>  03-28        LIVER FUNCTIONS - ( 28 Mar 2019 05:43 )  Alb: 2.2 g/dL / Pro: 5.4 gm/dL / ALK PHOS: 221 U/L / ALT: 39 U/L / AST: 177 U/L / GGT: x                                             CULTURES:

## 2019-03-29 LAB — SURGICAL PATHOLOGY STUDY: SIGNIFICANT CHANGE UP

## 2019-03-29 RX ORDER — SODIUM CHLORIDE 9 MG/ML
1000 INJECTION INTRAMUSCULAR; INTRAVENOUS; SUBCUTANEOUS
Qty: 0 | Refills: 0 | Status: DISCONTINUED | OUTPATIENT
Start: 2019-03-29 | End: 2019-03-29

## 2019-03-29 RX ORDER — ACETAMINOPHEN 500 MG
1000 TABLET ORAL ONCE
Qty: 0 | Refills: 0 | Status: COMPLETED | OUTPATIENT
Start: 2019-03-29 | End: 2019-03-29

## 2019-03-29 RX ORDER — ONDANSETRON 8 MG/1
4 TABLET, FILM COATED ORAL ONCE
Qty: 0 | Refills: 0 | Status: DISCONTINUED | OUTPATIENT
Start: 2019-03-29 | End: 2019-03-29

## 2019-03-29 RX ORDER — OXYCODONE HYDROCHLORIDE 5 MG/1
5 TABLET ORAL ONCE
Qty: 0 | Refills: 0 | Status: DISCONTINUED | OUTPATIENT
Start: 2019-03-29 | End: 2019-03-29

## 2019-03-29 RX ORDER — SODIUM CHLORIDE 9 MG/ML
1000 INJECTION INTRAMUSCULAR; INTRAVENOUS; SUBCUTANEOUS
Qty: 0 | Refills: 0 | Status: DISCONTINUED | OUTPATIENT
Start: 2019-03-29 | End: 2019-03-30

## 2019-03-29 RX ORDER — FENTANYL CITRATE 50 UG/ML
50 INJECTION INTRAVENOUS
Qty: 0 | Refills: 0 | Status: DISCONTINUED | OUTPATIENT
Start: 2019-03-29 | End: 2019-03-29

## 2019-03-29 RX ADMIN — PIPERACILLIN AND TAZOBACTAM 25 GRAM(S): 4; .5 INJECTION, POWDER, LYOPHILIZED, FOR SOLUTION INTRAVENOUS at 21:48

## 2019-03-29 RX ADMIN — PIPERACILLIN AND TAZOBACTAM 25 GRAM(S): 4; .5 INJECTION, POWDER, LYOPHILIZED, FOR SOLUTION INTRAVENOUS at 13:45

## 2019-03-29 RX ADMIN — ENOXAPARIN SODIUM 40 MILLIGRAM(S): 100 INJECTION SUBCUTANEOUS at 21:48

## 2019-03-29 RX ADMIN — PIPERACILLIN AND TAZOBACTAM 25 GRAM(S): 4; .5 INJECTION, POWDER, LYOPHILIZED, FOR SOLUTION INTRAVENOUS at 05:35

## 2019-03-29 RX ADMIN — Medication 3 MILLIGRAM(S): at 21:48

## 2019-03-29 RX ADMIN — SODIUM CHLORIDE 50 MILLILITER(S): 9 INJECTION INTRAMUSCULAR; INTRAVENOUS; SUBCUTANEOUS at 13:47

## 2019-03-29 RX ADMIN — Medication 400 MILLIGRAM(S): at 17:27

## 2019-03-29 NOTE — PROGRESS NOTE ADULT - SUBJECTIVE AND OBJECTIVE BOX
HOSPITALIST ATTENDING PROGRESS NOTE    Chart and meds reviewed.  Patient seen and examined.    HPI: 62 y/o female with no known PMHx that presents to the ED after being seen at [facility] for abdominal pain.  Patient states that she was told that her blood cultures were + and that she needed to come to the ED for evaluation.  Patients states that she has had intermittent dull and sharp 7/10 right upper quadrant pain for the last 6 weeks.  She states that she [place] these symptoms and was diagnosed with rib fracture.    She states that she has also had nausea,  fever, and constipation over this period of time.  Patient states that she has had a 7-10 lb weight loss and inappetence over the last month.  She states that her fever is intermittent and nocturnal.  She complains of chills accompanying her abdominal pain.      3/25/19 pt seen and examined, [place]  used this AM, daughter at bedside as well, patient not seen a physician in the a long time in [facility]. She lives 6 months in  and 6 months [place]. Lost 8-10 lbs in the past 2 months, has abd pain, nausea. Patient with no BM after bowel prep. passing flatus.     3/26/19 pt seen and examined, did have BM overnight, tolerated miralax prep, for colonoscopy today.    3/27/19 [facility]  used,  pt seen and examined, had BM last night, having nausea/bloating with diet, d/w GI, may need stenting. WBC elevated.     3/28/19 [place]  used ID [id number] pt seen and examined, feels a little better today, d/w GI and colorectal Sx.     3/29/19 pt seen and examined, daughter at bedside, feeling well, eating mainly soups/liquids. For GI stent today.    All 10 systems reviewed and found to be negative with the exception of what has been described above.    MEDICATIONS  (STANDING):  enoxaparin Injectable 40 milliGRAM(s) SubCutaneous every 24 hours  influenza   Vaccine 0.5 milliLiter(s) IntraMuscular once  lactobacillus acidophilus 1 Tablet(s) Oral daily  melatonin 3 milliGRAM(s) Oral at bedtime  piperacillin/tazobactam IVPB. 3.375 Gram(s) IV Intermittent every 8 hours  simethicone 80 milliGRAM(s) Chew every 12 hours  sodium chloride 0.9%. 1000 milliLiter(s) (50 mL/Hr) IV Continuous <Continuous>    MEDICATIONS  (PRN):  acetaminophen   Tablet .. 650 milliGRAM(s) Oral every 6 hours PRN Temp greater or equal to 38C (100.4F), Mild Pain (1 - 3)  dicyclomine 10 milliGRAM(s) Oral two times a day before meals PRN cramping  ondansetron Injectable 4 milliGRAM(s) IV Push every 6 hours PRN Nausea and/or Vomiting  oxyCODONE    IR 5 milliGRAM(s) Oral every 6 hours PRN Severe Pain (7 - 10)      VITALS:  T(F): 98.2 (03-29-19 @ 12:05), Max: 99.1 (03-29-19 @ 05:15)  HR: 86 (03-29-19 @ 12:05) (75 - 86)  BP: 129/65 (03-29-19 @ 12:05) (122/59 - 129/65)  RR: 17 (03-29-19 @ 12:05) (17 - 18)  SpO2: 96% (03-29-19 @ 12:05) (96% - 97%)  Wt(kg): --    I&O's Summary    28 Mar 2019 07:01  -  29 Mar 2019 07:00  --------------------------------------------------------  IN: 120 mL / OUT: 0 mL / NET: 120 mL        CAPILLARY BLOOD GLUCOSE          PHYSICAL EXAM:    HEENT:  pupils equal and reactive, EOMI, no oropharyngeal lesions, erythema, exudates, oral thrush  NECK:   supple, no carotid bruits, no palpable lymph nodes, no thyromegaly  CV:  +S1, +S2, regular, no murmurs or rubs  RESP:   lungs clear to auscultation bilaterally, no wheezing, rales, rhonchi, good air entry bilaterally  BREAST:  not examined  GI:  abdomen soft, non-tender, non-distended, normal BS, no bruits, no abdominal masses, no palpable masses  RECTAL:  not examined  :  not examined  MSK:   normal muscle tone, no atrophy, no rigidity, no contractions  EXT:  no clubbing, no cyanosis, no edema, no calf pain, swelling or erythema  VASCULAR:  pulses equal and symmetric in the upper and lower extremities  NEURO:  AAOX3, no focal neurological deficits, follows all commands, able to move extremities spontaneously  SKIN:  no ulcers, lesions or rashes    LABS:                            13.2   19.28 )-----------( 579      ( 28 Mar 2019 05:43 )             39.4     03-28    138  |  101  |  9   ----------------------------<  91  3.5   |  29  |  0.45<L>    Ca    8.0<L>      28 Mar 2019 05:43    TPro  5.4<L>  /  Alb  2.2<L>  /  TBili  0.5  /  DBili  x   /  AST  177<H>  /  ALT  39  /  AlkPhos  221<H>  03-28        LIVER FUNCTIONS - ( 28 Mar 2019 05:43 )  Alb: 2.2 g/dL / Pro: 5.4 gm/dL / ALK PHOS: 221 U/L / ALT: 39 U/L / AST: 177 U/L / GGT: x                                             CULTURES:

## 2019-03-29 NOTE — CHART NOTE - NSCHARTNOTEFT_GEN_A_CORE
Assessment:   * Pt currently NPO for a colonic stent procedure this afternoon 2/2 obstructing colon mass with possibility for a diversion sx if stent can't be placed.  * Prior to NPO status, pt has been following clear liquid diet 2/2 partial colon obstruction. Spoke with pts daughter who reports pt hasn't been able to consume or tolerate much of her meals/supplements due to nausea and emesis. Reports pt does not like the Ensure Clear Apple flavor, will consider Mixed Berry flavor when medically feasible-upgraded from clear liquid diet.   * Receiving IV abx and IVF.   * Zhou score 20, no edema noted, last BM today- noted per MD.     Recommendations:  1) continue with clear liquid diet s/p colonic stent procedure for optimal healing.   2) Upgrade diet as soon as medically feasible to improve PO intake.   3) monitor weights weekly.   3) monitor labs/lytes and replete as needed.   4) if appetite does not improve consider appetite enhancing medication for optimal intake and reduce risk of further wt loss.      Diet Presciption: Diet, Clear Liquid (03-27-19 @ 12:04)  Diet, NPO after Midnight:      NPO Start Date: 28-Mar-2019,   NPO Start Time: 23:59 (03-28-19 @ 17:13)  Diet, NPO after Midnight:      NPO Start Date: 28-Mar-2019,   NPO Start Time: 23:59 (03-28-19 @ 17:30)      Wt Hx:  Height (cm): 162.56 (03-23-19 @ 12:08)  Weight (kg): 53.1 (03-23-19 @ 12:08)  BMI (kg/m2): 20.1 (03-23-19 @ 12:08)      Estimated Energy Needs (calories/kg):  · Weight Used for Calculation  admission    Estimated Energy Needs (25-30 calories/kg):  · Weight  (lbs)  117 lb  · Weight (kg)  53 kg  · From (25cal/kg)  1325  · To (30cal/kg)  1590    Other Calculation:  · Other Calculation  Ht.   64   "        Wt. 117   #              BMI  20.1                IBW  125  #               Pt is at  94  %  IBW    Estimated Protein Needs (1.8-2.0 gm/kg):  · Weight  (lbs)  117  · Weight (kg)  53 kg  · From (1.8 g/kg)  95.4  · To (2.0 g/kg)  106    Estimated Fluid Needs (25-30 ml/kg):  · Weight  (lbs)  117  · Weight (kg)  53  · From (25 ml/kg)  1325  · To (30 ml/kg)  1590    Estimated Needs:   [x] no change since previous assessment      Nutrition Diagnostic #1:  · Nutrition Diagnostic Terminology #1: Nutrient  · Nutrient: Malnutrition; Pt meets criteria for severe protein/calorie malnutrition in context of chronic illness secondary to significant weight loss, poor po intake <75% x > 1 month, moderate/severe muscle/fat wasting.  · Etiology: inability to consume adequate energy/protein to meet ENN 2/2 Colon CA w/ mets to Liver  · Signs/Symptoms: poor po intake, significant wt loss, severe/moderate fat/muscle wasting  · Nutrition Intervention: Meals and Snack; Medical Food Supplements  · Meals and Snacks: General/healthful diet  · Medical and Food Supplements: Commercial beverage; Modified food  · Goal/Expected Outcome: PO intake to meet ENN and reduce risk of further wt loss, reduced s/s of malnutrition    Nutrition Diagnosis is [x] ongoing  [ ] resolved [ ] not applicable          Pertinent Medications: MEDICATIONS  (STANDING):  enoxaparin Injectable 40 milliGRAM(s) SubCutaneous every 24 hours  influenza   Vaccine 0.5 milliLiter(s) IntraMuscular once  lactobacillus acidophilus 1 Tablet(s) Oral daily  melatonin 3 milliGRAM(s) Oral at bedtime  piperacillin/tazobactam IVPB. 3.375 Gram(s) IV Intermittent every 8 hours  simethicone 80 milliGRAM(s) Chew every 12 hours  sodium chloride 0.9%. 1000 milliLiter(s) (50 mL/Hr) IV Continuous <Continuous>    MEDICATIONS  (PRN):  acetaminophen   Tablet .. 650 milliGRAM(s) Oral every 6 hours PRN Temp greater or equal to 38C (100.4F), Mild Pain (1 - 3)  dicyclomine 10 milliGRAM(s) Oral two times a day before meals PRN cramping  ondansetron Injectable 4 milliGRAM(s) IV Push every 6 hours PRN Nausea and/or Vomiting  oxyCODONE    IR 5 milliGRAM(s) Oral every 6 hours PRN Severe Pain (7 - 10)    Pertinent Labs: 03-28 Na138 mmol/L Glu 91 mg/dL K+ 3.5 mmol/L Cr  0.45 mg/dL<L> BUN 9 mg/dL 03-28 Alb 2.2 g/dL<L>     CAPILLARY BLOOD GLUCOSE          Skin: zhou score = 20          Monitoring and Evaluation:   [x] PO intake/Nutr support infusion [ x ] Tolerance to Nutr [ x ] weights [ x ] labs[ x ] follow up per protocol  [ ] other:

## 2019-03-29 NOTE — PROGRESS NOTE ADULT - SUBJECTIVE AND OBJECTIVE BOX
Had a bowel movement and passing flatus. Denies abdominal pain and feels less distended. No  nausea or emesis    Vital Signs Last 24 Hrs  T(C): 36.8 (29 Mar 2019 12:05), Max: 37.3 (29 Mar 2019 05:15)  T(F): 98.2 (29 Mar 2019 12:05), Max: 99.1 (29 Mar 2019 05:15)  HR: 86 (29 Mar 2019 12:05) (75 - 86)  BP: 129/65 (29 Mar 2019 12:05) (122/59 - 129/65)  BP(mean): --  RR: 17 (29 Mar 2019 12:05) (17 - 18)  SpO2: 96% (29 Mar 2019 12:05) (96% - 97%)    In no distress  Non labored breathing  Abdomen soft, non tender, improved distension from yesterday  Alert and oriented x 3                          13.2   19.28 )-----------( 579      ( 28 Mar 2019 05:43 )             39.4   03-28    138  |  101  |  9   ----------------------------<  91  3.5   |  29  |  0.45<L>    Ca    8.0<L>      28 Mar 2019 05:43    TPro  5.4<L>  /  Alb  2.2<L>  /  TBili  0.5  /  DBili  x   /  AST  177<H>  /  ALT  39  /  AlkPhos  221<H>  03-28      MEDICATIONS  (STANDING):  enoxaparin Injectable 40 milliGRAM(s) SubCutaneous every 24 hours  influenza   Vaccine 0.5 milliLiter(s) IntraMuscular once  lactobacillus acidophilus 1 Tablet(s) Oral daily  melatonin 3 milliGRAM(s) Oral at bedtime  piperacillin/tazobactam IVPB. 3.375 Gram(s) IV Intermittent every 8 hours  simethicone 80 milliGRAM(s) Chew every 12 hours

## 2019-03-29 NOTE — PROGRESS NOTE ADULT - SUBJECTIVE AND OBJECTIVE BOX
Colonic stent placed across obstructing tumor at splenic flexure  patient passing stool immediately after procedure  Bx +adenocarcinoma    Recommend:  -clears  -ambulate  -onc eval ongoing  -full report to follow in results section  -d/w pt's dtr

## 2019-03-30 VITALS
TEMPERATURE: 98 F | HEART RATE: 106 BPM | SYSTOLIC BLOOD PRESSURE: 104 MMHG | OXYGEN SATURATION: 99 % | RESPIRATION RATE: 16 BRPM | DIASTOLIC BLOOD PRESSURE: 59 MMHG

## 2019-03-30 LAB
ADD ON TEST-SPECIMEN IN LAB: SIGNIFICANT CHANGE UP
ALBUMIN SERPL ELPH-MCNC: 2.1 G/DL — LOW (ref 3.3–5)
ALP SERPL-CCNC: 219 U/L — HIGH (ref 40–120)
ALT FLD-CCNC: 55 U/L — SIGNIFICANT CHANGE UP (ref 12–78)
ANION GAP SERPL CALC-SCNC: 8 MMOL/L — SIGNIFICANT CHANGE UP (ref 5–17)
AST SERPL-CCNC: 343 U/L — HIGH (ref 15–37)
BILIRUB SERPL-MCNC: 0.9 MG/DL — SIGNIFICANT CHANGE UP (ref 0.2–1.2)
BUN SERPL-MCNC: 6 MG/DL — LOW (ref 7–23)
CALCIUM SERPL-MCNC: 7.8 MG/DL — LOW (ref 8.5–10.1)
CHLORIDE SERPL-SCNC: 99 MMOL/L — SIGNIFICANT CHANGE UP (ref 96–108)
CO2 SERPL-SCNC: 31 MMOL/L — SIGNIFICANT CHANGE UP (ref 22–31)
CREAT SERPL-MCNC: 0.38 MG/DL — LOW (ref 0.5–1.3)
GLUCOSE SERPL-MCNC: 96 MG/DL — SIGNIFICANT CHANGE UP (ref 70–99)
HCT VFR BLD CALC: 37.5 % — SIGNIFICANT CHANGE UP (ref 34.5–45)
HGB BLD-MCNC: 12.3 G/DL — SIGNIFICANT CHANGE UP (ref 11.5–15.5)
MAGNESIUM SERPL-MCNC: 1.9 MG/DL — SIGNIFICANT CHANGE UP (ref 1.6–2.6)
MCHC RBC-ENTMCNC: 27.5 PG — SIGNIFICANT CHANGE UP (ref 27–34)
MCHC RBC-ENTMCNC: 32.8 GM/DL — SIGNIFICANT CHANGE UP (ref 32–36)
MCV RBC AUTO: 83.9 FL — SIGNIFICANT CHANGE UP (ref 80–100)
NRBC # BLD: 0 /100 WBCS — SIGNIFICANT CHANGE UP (ref 0–0)
PLATELET # BLD AUTO: 500 K/UL — HIGH (ref 150–400)
POTASSIUM SERPL-MCNC: 3 MMOL/L — LOW (ref 3.5–5.3)
POTASSIUM SERPL-MCNC: 3.8 MMOL/L — SIGNIFICANT CHANGE UP (ref 3.5–5.3)
POTASSIUM SERPL-SCNC: 3 MMOL/L — LOW (ref 3.5–5.3)
POTASSIUM SERPL-SCNC: 3.8 MMOL/L — SIGNIFICANT CHANGE UP (ref 3.5–5.3)
PROT SERPL-MCNC: 5.4 GM/DL — LOW (ref 6–8.3)
RBC # BLD: 4.47 M/UL — SIGNIFICANT CHANGE UP (ref 3.8–5.2)
RBC # FLD: 14.4 % — SIGNIFICANT CHANGE UP (ref 10.3–14.5)
SODIUM SERPL-SCNC: 138 MMOL/L — SIGNIFICANT CHANGE UP (ref 135–145)
WBC # BLD: 19.04 K/UL — HIGH (ref 3.8–10.5)
WBC # FLD AUTO: 19.04 K/UL — HIGH (ref 3.8–10.5)

## 2019-03-30 PROCEDURE — 99233 SBSQ HOSP IP/OBS HIGH 50: CPT

## 2019-03-30 RX ORDER — OXYCODONE HYDROCHLORIDE 5 MG/1
1 TABLET ORAL
Qty: 20 | Refills: 0 | OUTPATIENT
Start: 2019-03-30 | End: 2019-04-03

## 2019-03-30 RX ORDER — POTASSIUM CHLORIDE 20 MEQ
40 PACKET (EA) ORAL EVERY 4 HOURS
Qty: 0 | Refills: 0 | Status: COMPLETED | OUTPATIENT
Start: 2019-03-30 | End: 2019-03-30

## 2019-03-30 RX ADMIN — Medication 40 MILLIEQUIVALENT(S): at 14:47

## 2019-03-30 RX ADMIN — PIPERACILLIN AND TAZOBACTAM 25 GRAM(S): 4; .5 INJECTION, POWDER, LYOPHILIZED, FOR SOLUTION INTRAVENOUS at 05:22

## 2019-03-30 RX ADMIN — Medication 1 TABLET(S): at 11:37

## 2019-03-30 RX ADMIN — INFLUENZA VIRUS VACCINE 0.5 MILLILITER(S): 15; 15; 15; 15 SUSPENSION INTRAMUSCULAR at 17:24

## 2019-03-30 RX ADMIN — Medication 40 MILLIEQUIVALENT(S): at 10:35

## 2019-03-30 NOTE — DISCHARGE NOTE NURSING/CASE MANAGEMENT/SOCIAL WORK - NSDCPEWEB_GEN_ALL_CORE
[facility] for Tobacco Control website --- [email or web address] --- [email or web address]

## 2019-03-30 NOTE — DISCHARGE NOTE PROVIDER - NSDCCPCAREPLAN_GEN_ALL_CORE_FT
PRINCIPAL DISCHARGE DIAGNOSIS  Diagnosis: Colon cancer metastasized to liver  Assessment and Plan of Treatment:

## 2019-03-30 NOTE — DISCHARGE NOTE PROVIDER - HOSPITAL COURSE
62 y/o female with no known PMHx that presents to the ED after being seen at [facility] for abdominal pain.  Patient states that she was told that her blood cultures were + and that she needed to come to the ED for evaluation.  Patients states that she has had intermittent dull and sharp 7/10 right upper quadrant pain for the last 6 weeks.  She states that [place] these symptoms and was diagnosed with rib fracture.    She states that she has also had nausea,  fever, and constipation over this period of time.  Patient states that she has had a 7-10 lb weight loss and inappetence over the last month.  She states that her fever is intermittent and nocturnal.  She complains of chills accompanying her abdominal pain.          3/25/19 pt seen and examined, [facility]  used this AM, daughter at bedside as well, patient not seen a physician in the a long time in [facility]. She lives 6 months in  and 6 months [place]. Lost 8-10 lbs in the past 2 months, has abd pain, nausea. Patient with no BM after bowel prep. passing flatus.         3/26/19 pt seen and examined, did have BM overnight, tolerated miralax prep, for colonoscopy today.        3/27/19 [facility]  used,  pt seen and examined, had BM last night, having nausea/bloating with diet, d/w GI, may need stenting. WBC elevated.         3/28/19 [facility]  used ID [id number] pt seen and examined, feels a little better today, d/w GI and colorectal Sx.         3/29/19 pt seen and examined, daughter at bedside, feeling well, eating mainly soups/liquids. For GI stent today.        Hospital course: Patient was admitted for abd pain and nausea and it was due to a metastatic disease with a mass in the colon. The colonoscopy was done and Bx was performed.  will follow pu with patient. She was advised to seek an appt with  in 1-2 days for a diagnosis. She was noted to still have partial obstructive GI symptoms and underwent stending with  with improvements in her bowel movements and clinical symptoms. She is stable for DC home today. She was also seen by colorectal surgery.             PHYSICAL EXAM:        Constitutional: NAD, awake and alert, well-developed    HEENT: PERR, EOMI, Normal Hearing, MMM    Neck: Soft and supple, No LAD, No JVD    Respiratory: Breath sounds are clear bilaterally, No wheezing, rales or rhonchi    Cardiovascular: S1 and S2, regular rate and rhythm, no Murmurs, gallops or rubs    Gastrointestinal: Bowel Sounds present, soft, nontender, nondistended, no guarding, no rebound    Extremities: No peripheral edema    Vascular: 2+ peripheral pulses    Neurological: A/O x 3, no focal deficits    Musculoskeletal: 5/5 strength b/l upper and lower extremities    Skin: No rashes        total time for discharge 45 minutes

## 2019-03-30 NOTE — PROGRESS NOTE ADULT - ASSESSMENT
A/P -     Regular diet as tolerated.  F/U with GI and oncology as outpt.  No surgical intervention required this hospitalization.  D/C per medical service.
# R/O Blood culture positive per family/patient "Urgent care notified infection in blood"  # Possible Colon CA with mets  # Moderate Protein-Calorie Malnutrition  # Elevated LFT, probably due to above      Plan:   - Discussed with [facility] and no urine cx or Blood Cx was done. The patient was called for WBC of 17K. DC abx given all Cx are negative  - encourage 2000 calories intake per day  - NPO after midnight for possible colonoscopy tomorrow.     discussed with patient, daughter, RN, RN manager, CM, SW
# R/O Blood culture positive per family/patient "Urgent care notified infection in blood"  # Possible Colon CA with mets  # Moderate Protein-Calorie Malnutrition  # Elevated LFT, probably due to above  # Luekocytosis, with ?fungating mass on colonoscopy      Plan:   - Discussed with [facility] and no urine cx or Blood Cx was done. The patient was called for WBC of 17K. DC abx given all Cx are negative  - encourage 2000 calories intake per day  - s/p Bx with colonoscopy yesterday, the fungating mass, ? infected, will cover with zosyn, denies any other complaints, ID input requested  - May need stenting to help relieve the obstruction      discussed with patient, daughter, RN, RN manager, CM, SW
62 yo female with metastatic colon cancer, s/p stent. Will advance diet as tolerated. Patient can be follow up as outpatient if necessary.
63 year old female with partially obstructing colon mass and multiple liver and lung lesions concerning for metastatic disease. Awaiting path. Improved distension today and had bowel movement. Plan is for colonic stent today w/ Dr. Giron. Surgery for diversion indicated only if becomes obstructed and stent cannot be placed. Still has leukocytosis but no localized infection. On IV abx
63 year old female with partially obstructing colon mass and multiple liver and lung lesions concerning for metastatic disease. Awaiting path. Improved distension today and had bowel movement. Plan is for colonic stent tomorrow. Surgery for diversion indicated only if becomes obstructed and stent cannot be placed. Still has leukocytosis but no localized infection. On IV abx
63-year-old woman with a descending colon mass on CT, and extensive tumor burden in the liver.  Likely metastatic colon cancer.    recommendations:  -She will need a colonoscopy with biopsy, we will reschedule this tomorrow  -Risk benefits and alternatives were discussed with the patient and her sister, all questions were answered  -will try miralax prep with dulcolax  -DVT prophylaxis  -d/w RN   -d/w Dr. Gorman
63-year-old woman with a descending colon mass on CT, and extensive tumor burden in the liver.  Likely metastatic colon cancer.  Flex sig 3/27 with large obstructing tumor in descending colon.    recommendations:  -follow up colon bx  -onc input re treatment  -will need diversion or colonic stent, please consult CRS  -clear diet for now given partial obstruction  -DVT prophylaxis  -d/w pt and sister  -d/w Dr. Gorman
63-year-old woman with a descending colon mass on CT, and extensive tumor burden in the liver.  Likely metastatic colon cancer.  Flex sig 3/27 with large obstructing tumor in descending colon.    recommendations:  -follow up colon bx  -onc input re treatment appreciated  -will need colonic stent, scheduled for 3 PM tomorrow  -CRS eval appreciated  -stent d/w patient, agreeable  -clear diet for now given partial obstruction, NPO p MN  -DVT prophylaxis  -d/w Dr. Gorman
This is a 62 yo female with no prior history now found with obstructive colon lesion as well as radiographic suggestiion of hepatic metastases and pulmonary disease.      Will need to await pathology for confirmation.   check  CEA.   discussed role of chemotherapy with sister and patient.   Will defer chemotherapy to outpatient    Thank you for allowing me to participate in Ms. Gibson's care.
· Assessment		    # Possible Colon CA with mets  # Moderate Protein-Calorie Malnutrition  # Elevated LFT, probably due to above  # Luekocytosis, with ?fungating mass on colonoscopy      Plan:   - Discussed with [facility] and no urine cx or Blood Cx was done. The patient was called for WBC of 17K. DC abx given all Cx are negative  - encourage 2000 calories intake per day  - s/p Bx with colonoscopy yesterday, the fungating mass, ? infected, will cover with zosyn, denies any other complaints, ID input appreciated  - May need stenting to help relieve the obstruction, scheduled for tommorow at 3pm      discussed with patient, daughter, RN, RN manager, CM, SW
· Assessment		    # Possible Colon CA with mets s/p Bx pending path  # Moderate Protein-Calorie Malnutrition  # Elevated LFT, probably due to above  # Luekocytosis, with ?fungating mass on colonoscopy      Plan:   - Discussed with [facility] and no urine cx or Blood Cx was done. The patient was called for WBC of 17K. DC abx given all Cx are negative  - encourage 2000 calories intake per day  - s/p Bx with colonoscopy , the fungating mass, ID input appreciated, leukocytosis likely reactive, may DC abx tomorrow after stenting today  - If stenting unsuccessful, may need Sx intervention      discussed with patient, daughter, RN, RN manager, CM, SW
· Assessment		  # R/O Blood culture positive per family/patient "Urgent care notified infection in blood"  # Possible Colon CA with mets  # Moderate Protein-Calorie Malnutrition  # Elevated LFT, probably due to above      Plan:   - Discussed with [facility] and no urine cx or Blood Cx was done. The patient was called for WBC of 17K. DC abx given all Cx are negative  - encourage 2000 calories intake per day  - colonoscopy today    discussed with patient, daughter, RN, RN manager, CM, SW

## 2019-03-30 NOTE — DISCHARGE NOTE PROVIDER - CARE PROVIDERS DIRECT ADDRESSES
,DirectAddress_Unknown,DirectAddress_Unknown,[email or web address]

## 2019-03-30 NOTE — DISCHARGE NOTE NURSING/CASE MANAGEMENT/SOCIAL WORK - NSDCPEEMAIL_GEN_ALL_CORE
[facility] email [email or web address]

## 2019-03-30 NOTE — PROGRESS NOTE ADULT - REASON FOR ADMISSION
sent in for + blood cultures

## 2019-03-30 NOTE — DISCHARGE NOTE PROVIDER - PROVIDER TOKENS
PROVIDER:[TOKEN:[[id number]]],PROVIDER:[TOKEN:[[id number]]],PROVIDER:[TOKEN:[[id number]]]

## 2019-03-30 NOTE — PROGRESS NOTE ADULT - PROVIDER SPECIALTY LIST ADULT
Colorectal Surgery
Colorectal Surgery
Gastroenterology
Heme/Onc
Hospitalist
Colorectal Surgery
Detail Level: Detailed

## 2019-03-30 NOTE — DISCHARGE NOTE NURSING/CASE MANAGEMENT/SOCIAL WORK - NSDCDPATPORTLINK_GEN_ALL_CORE
You can access the [facility] Portal, offered by [facility], by registering with the following website: [email or web address]

## 2019-03-30 NOTE — PROGRESS NOTE ADULT - SUBJECTIVE AND OBJECTIVE BOX
No c/o. Edda solid food. Passing flatus and stools. Denies worsening abdominal distention or pain.    MEDICATIONS  (STANDING):  enoxaparin Injectable 40 milliGRAM(s) SubCutaneous every 24 hours  influenza   Vaccine 0.5 milliLiter(s) IntraMuscular once  lactobacillus acidophilus 1 Tablet(s) Oral daily  melatonin 3 milliGRAM(s) Oral at bedtime  piperacillin/tazobactam IVPB. 3.375 Gram(s) IV Intermittent every 8 hours  potassium chloride    Tablet ER 40 milliEquivalent(s) Oral every 4 hours  simethicone 80 milliGRAM(s) Chew every 12 hours    MEDICATIONS  (PRN):  acetaminophen   Tablet .. 650 milliGRAM(s) Oral every 6 hours PRN Temp greater or equal to 38C (100.4F), Mild Pain (1 - 3)  dicyclomine 10 milliGRAM(s) Oral two times a day before meals PRN cramping  ondansetron Injectable 4 milliGRAM(s) IV Push every 6 hours PRN Nausea and/or Vomiting  oxyCODONE    IR 5 milliGRAM(s) Oral every 6 hours PRN Severe Pain (7 - 10)    Vital Signs Last 24 Hrs  T(C): 37.6 (30 Mar 2019 04:41), Max: 37.6 (30 Mar 2019 04:41)  T(F): 99.7 (30 Mar 2019 04:41), Max: 99.7 (30 Mar 2019 04:41)  HR: 92 (30 Mar 2019 04:41) (86 - 108)  BP: 121/56 (30 Mar 2019 04:41) (105/55 - 129/65)  BP(mean): --  RR: 18 (30 Mar 2019 04:41) (16 - 23)  SpO2: 95% (30 Mar 2019 04:41) (95% - 99%)  I&O's Detail    29 Mar 2019 07:01  -  30 Mar 2019 07:00  --------------------------------------------------------  IN:    Other: 1400 mL  Total IN: 1400 mL    OUT:  Total OUT: 0 mL    Total NET: 1400 mL    NAD  ABD exam : soft, NT, mild distention                        12.3   19.04 )-----------( 500      ( 30 Mar 2019 05:46 )             37.5     03-30    138  |  99  |  6<L>  ----------------------------<  96  3.0<L>   |  31  |  0.38<L>    Ca    7.8<L>      30 Mar 2019 05:46    TPro  5.4<L>  /  Alb  2.1<L>  /  TBili  0.9  /  DBili  x   /  AST  343<H>  /  ALT  55  /  AlkPhos  219<H>  03-30

## 2019-03-30 NOTE — PROGRESS NOTE ADULT - SUBJECTIVE AND OBJECTIVE BOX
Patient is a 63y old  Female who presents with a chief complaint of sent in for + blood cultures (29 Mar 2019 17:25)      HPI:  64 y/o female with no known PMHx that presents to the ED after being seen at [facility] for abdominal pain.  Patient states that she was told that her blood cultures were + and that she needed to come to the ED for evaluation.  Patients states that she has had intermittent dull and sharp 7/10 right upper quadrant pain for the last 6 weeks.  She states that she was seen in [place] ([facility]) for these symptoms and was diagnosed with rib fracture.    She states that she has also had nausea,  fever, and constipation over this period of time.  Patient states that she has had a 7-10 lb weight loss and inappetence over the last month.  She states that her fever is intermittent and nocturnal.  She complains of chills accompanying her abdominal pain.      ED course:  CT abdomen/pelvis demonstrate colonic mass, and multiple liver lesions. WBC 17-14 (23 Mar 2019 18:03)    Patient is s/p colonic stent. Feels well. Moving bowels. Hungry.       PAST MEDICAL & SURGICAL HISTORY:  No pertinent past medical history  H/O:       MEDICATIONS  (STANDING):  enoxaparin Injectable 40 milliGRAM(s) SubCutaneous every 24 hours  influenza   Vaccine 0.5 milliLiter(s) IntraMuscular once  lactobacillus acidophilus 1 Tablet(s) Oral daily  melatonin 3 milliGRAM(s) Oral at bedtime  piperacillin/tazobactam IVPB. 3.375 Gram(s) IV Intermittent every 8 hours  potassium chloride    Tablet ER 40 milliEquivalent(s) Oral every 4 hours  simethicone 80 milliGRAM(s) Chew every 12 hours  sodium chloride 0.9%. 1000 milliLiter(s) (50 mL/Hr) IV Continuous <Continuous>    MEDICATIONS  (PRN):  acetaminophen   Tablet .. 650 milliGRAM(s) Oral every 6 hours PRN Temp greater or equal to 38C (100.4F), Mild Pain (1 - 3)  dicyclomine 10 milliGRAM(s) Oral two times a day before meals PRN cramping  ondansetron Injectable 4 milliGRAM(s) IV Push every 6 hours PRN Nausea and/or Vomiting  oxyCODONE    IR 5 milliGRAM(s) Oral every 6 hours PRN Severe Pain (7 - 10)      Allergies    No Known Allergies    Intolerances        RVital Signs Last 24 Hrs  T(C): 37.6 (30 Mar 2019 04:41), Max: 37.6 (30 Mar 2019 04:41)  T(F): 99.7 (30 Mar 2019 04:41), Max: 99.7 (30 Mar 2019 04:41)  HR: 92 (30 Mar 2019 04:41) (86 - 108)  BP: 121/56 (30 Mar 2019 04:41) (105/55 - 129/65)  BP(mean): --  RR: 18 (30 Mar 2019 04:41) (16 - 23)  SpO2: 95% (30 Mar 2019 04:41) (95% - 99%)    Respiratory: CTAB  Cardiovascular: S1 and S2, RRR, no M/G/R  Gastrointestinal: BS+, softly distended, nontender  Extremities: No peripheral edema  Psychiatric: Normal mood, normal affect  Skin: No rashes    LABS:                        12.3   19.04 )-----------( 500      ( 30 Mar 2019 05:46 )             37.5         138  |  99  |  6<L>  ----------------------------<  96  3.0<L>   |  31  |  0.38<L>    Ca    7.8<L>      30 Mar 2019 05:46    TPro  5.4<L>  /  Alb  2.1<L>  /  TBili  0.9  /  DBili  x   /  AST  343<H>  /  ALT  55  /  AlkPhos  219<H>        LIVER FUNCTIONS - ( 30 Mar 2019 05:46 )  Alb: 2.1 g/dL / Pro: 5.4 gm/dL / ALK PHOS: 219 U/L / ALT: 55 U/L / AST: 343 U/L / GGT: x             RADIOLOGY & ADDITIONAL STUDIES:

## 2019-03-30 NOTE — DISCHARGE NOTE PROVIDER - CARE PROVIDER_API CALL
Micheal Giron)  Gastroenterology; Internal Medicine  [address], [address]  [place] [address]  Phone: [phone]  Fax: [phone]  Follow Up Time:     Kike Valdes)  Internal Medicine  [address]  [place] [address]  Phone: [phone]  Fax: [phone]  Follow Up Time:     Christina Ramírez)  ColonRectal Surgery; Surgery  [address] [place] [address]  Phone: [phone]  Fax: [phone]  Follow Up Time:

## 2019-03-31 NOTE — PROVIDER CONTACT NOTE (OTHER) - SITUATION
DC Note faxed to Dr Kike Valdes at [phone]
Consult made spoke with from answering service [place]
ID consult called in, spoke to Janae
Pt drinking Miralax prep for colonoscopy. Pt vomited and is having nausea.
Spoke with Taras in the answering service regarding consult
consult called in, spoke to Karo
pt admitted for abdominal pain, moviprep started and unable to tolerate, vomiting and nausea

## 2019-03-31 NOTE — PROVIDER CONTACT NOTE (OTHER) - DATE AND TIME:
31-Mar-2019 10:58
23-Mar-2019 19:41
24-Mar-2019 21:00
25-Mar-2019 21:45
26-Mar-2019 02:30
26-Mar-2019 17:16
27-Mar-2019 14:47
27-Mar-2019 14:53

## 2019-04-03 DIAGNOSIS — C18.6 MALIGNANT NEOPLASM OF DESCENDING COLON: ICD-10-CM

## 2019-04-03 DIAGNOSIS — D72.829 ELEVATED WHITE BLOOD CELL COUNT, UNSPECIFIED: ICD-10-CM

## 2019-04-03 DIAGNOSIS — C78.7 SECONDARY MALIGNANT NEOPLASM OF LIVER AND INTRAHEPATIC BILE DUCT: ICD-10-CM

## 2019-04-03 DIAGNOSIS — E43 UNSPECIFIED SEVERE PROTEIN-CALORIE MALNUTRITION: ICD-10-CM

## 2019-04-03 DIAGNOSIS — Z87.891 PERSONAL HISTORY OF NICOTINE DEPENDENCE: ICD-10-CM

## 2019-04-11 ENCOUNTER — OUTPATIENT (OUTPATIENT)
Dept: OUTPATIENT SERVICES | Facility: HOSPITAL | Age: 64
LOS: 1 days | Discharge: HOME | End: 2019-04-11

## 2019-04-11 DIAGNOSIS — Z98.891 HISTORY OF UTERINE SCAR FROM PREVIOUS SURGERY: Chronic | ICD-10-CM

## 2019-04-11 DIAGNOSIS — C78.7 SECONDARY MALIGNANT NEOPLASM OF LIVER AND INTRAHEPATIC BILE DUCT: ICD-10-CM

## 2019-04-11 DIAGNOSIS — Z98.890 OTHER SPECIFIED POSTPROCEDURAL STATES: Chronic | ICD-10-CM

## 2019-04-11 LAB
ANION GAP SERPL CALC-SCNC: 10 MMOL/L — SIGNIFICANT CHANGE UP (ref 5–17)
APPEARANCE UR: CLEAR — SIGNIFICANT CHANGE UP
BACTERIA # UR AUTO: ABNORMAL
BASOPHILS # BLD AUTO: 0.05 K/UL — SIGNIFICANT CHANGE UP (ref 0–0.2)
BASOPHILS NFR BLD AUTO: 0.3 % — SIGNIFICANT CHANGE UP (ref 0–2)
BILIRUB UR-MCNC: NEGATIVE — SIGNIFICANT CHANGE UP
BUN SERPL-MCNC: 6 MG/DL — LOW (ref 7–23)
CALCIUM SERPL-MCNC: 8.7 MG/DL — SIGNIFICANT CHANGE UP (ref 8.5–10.1)
CHLORIDE SERPL-SCNC: 99 MMOL/L — SIGNIFICANT CHANGE UP (ref 96–108)
CO2 SERPL-SCNC: 26 MMOL/L — SIGNIFICANT CHANGE UP (ref 22–31)
COLOR SPEC: ABNORMAL
CREAT SERPL-MCNC: 0.53 MG/DL — SIGNIFICANT CHANGE UP (ref 0.5–1.3)
DIFF PNL FLD: ABNORMAL
EOSINOPHIL # BLD AUTO: 0.1 K/UL — SIGNIFICANT CHANGE UP (ref 0–0.5)
EOSINOPHIL NFR BLD AUTO: 0.6 % — SIGNIFICANT CHANGE UP (ref 0–6)
EPI CELLS # UR: ABNORMAL
GLUCOSE SERPL-MCNC: 114 MG/DL — HIGH (ref 70–99)
GLUCOSE UR QL: NEGATIVE MG/DL — SIGNIFICANT CHANGE UP
HCT VFR BLD CALC: 37.6 % — SIGNIFICANT CHANGE UP (ref 34.5–45)
HGB BLD-MCNC: 12 G/DL — SIGNIFICANT CHANGE UP (ref 11.5–15.5)
IMM GRANULOCYTES NFR BLD AUTO: 1.2 % — SIGNIFICANT CHANGE UP (ref 0–1.5)
KETONES UR-MCNC: ABNORMAL
LEUKOCYTE ESTERASE UR-ACNC: ABNORMAL
LYMPHOCYTES # BLD AUTO: 12.8 % — LOW (ref 13–44)
LYMPHOCYTES # BLD AUTO: 2.25 K/UL — SIGNIFICANT CHANGE UP (ref 1–3.3)
MCHC RBC-ENTMCNC: 27.4 PG — SIGNIFICANT CHANGE UP (ref 27–34)
MCHC RBC-ENTMCNC: 31.9 GM/DL — LOW (ref 32–36)
MCV RBC AUTO: 85.8 FL — SIGNIFICANT CHANGE UP (ref 80–100)
MONOCYTES # BLD AUTO: 1.47 K/UL — HIGH (ref 0–0.9)
MONOCYTES NFR BLD AUTO: 8.4 % — SIGNIFICANT CHANGE UP (ref 2–14)
MRSA PCR RESULT.: SIGNIFICANT CHANGE UP
NEUTROPHILS # BLD AUTO: 13.49 K/UL — HIGH (ref 1.8–7.4)
NEUTROPHILS NFR BLD AUTO: 76.7 % — SIGNIFICANT CHANGE UP (ref 43–77)
NITRITE UR-MCNC: NEGATIVE — SIGNIFICANT CHANGE UP
NRBC # BLD: 0 /100 WBCS — SIGNIFICANT CHANGE UP (ref 0–0)
PH UR: 5 — SIGNIFICANT CHANGE UP (ref 5–8)
PLATELET # BLD AUTO: 632 K/UL — HIGH (ref 150–400)
POTASSIUM SERPL-MCNC: 4.1 MMOL/L — SIGNIFICANT CHANGE UP (ref 3.5–5.3)
POTASSIUM SERPL-SCNC: 4.1 MMOL/L — SIGNIFICANT CHANGE UP (ref 3.5–5.3)
PROT UR-MCNC: 15 MG/DL
RBC # BLD: 4.38 M/UL — SIGNIFICANT CHANGE UP (ref 3.8–5.2)
RBC # FLD: 16.2 % — HIGH (ref 10.3–14.5)
RBC CASTS # UR COMP ASSIST: SIGNIFICANT CHANGE UP /HPF (ref 0–4)
S AUREUS DNA NOSE QL NAA+PROBE: SIGNIFICANT CHANGE UP
SODIUM SERPL-SCNC: 135 MMOL/L — SIGNIFICANT CHANGE UP (ref 135–145)
SP GR SPEC: 1.02 — SIGNIFICANT CHANGE UP (ref 1.01–1.02)
UROBILINOGEN FLD QL: 1 MG/DL
WBC # BLD: 17.57 K/UL — HIGH (ref 3.8–10.5)
WBC # FLD AUTO: 17.57 K/UL — HIGH (ref 3.8–10.5)
WBC UR QL: SIGNIFICANT CHANGE UP

## 2019-04-11 NOTE — ASU PATIENT PROFILE, ADULT - TEACHING/LEARNING LEARNING PREFERENCES
pictorial/skill demonstration/verbal instruction/computer/internet/group instruction/written material/audio/individual instruction/video

## 2019-04-11 NOTE — CHART NOTE - NSCHARTNOTEFT_GEN_A_CORE
Vital Signs  Pulse 98  B/P 108/72  SpO2 99%  Resp. 16  Temperature 98.4    Plan   1. NPO after midnight  2.  Use E-Z sponge as directed  3. Use Mupirocin as directed.  4. Drink a quart of extra  fluids the day before your surgery.  5. CBC, BMP, Urinalysis, MRSA sent to lab

## 2019-04-12 PROBLEM — Z78.9 OTHER SPECIFIED HEALTH STATUS: Chronic | Status: INACTIVE | Noted: 2019-03-23 | Resolved: 2019-04-11

## 2019-04-12 RX ORDER — OXYCODONE HYDROCHLORIDE 5 MG/1
5 TABLET ORAL ONCE
Qty: 0 | Refills: 0 | Status: DISCONTINUED | OUTPATIENT
Start: 2019-04-15 | End: 2019-04-15

## 2019-04-12 RX ORDER — ACETAMINOPHEN 500 MG
975 TABLET ORAL ONCE
Qty: 0 | Refills: 0 | Status: COMPLETED | OUTPATIENT
Start: 2019-04-15 | End: 2019-04-15

## 2019-04-12 RX ORDER — SODIUM CHLORIDE 9 MG/ML
1000 INJECTION INTRAMUSCULAR; INTRAVENOUS; SUBCUTANEOUS
Qty: 0 | Refills: 0 | Status: DISCONTINUED | OUTPATIENT
Start: 2019-04-15 | End: 2019-04-30

## 2019-04-12 RX ORDER — ACETAMINOPHEN 500 MG
1000 TABLET ORAL ONCE
Qty: 0 | Refills: 0 | Status: DISCONTINUED | OUTPATIENT
Start: 2019-04-15 | End: 2019-04-30

## 2019-04-12 RX ORDER — FAMOTIDINE 10 MG/ML
20 INJECTION INTRAVENOUS ONCE
Qty: 0 | Refills: 0 | Status: COMPLETED | OUTPATIENT
Start: 2019-04-15 | End: 2019-04-15

## 2019-04-12 RX ORDER — ONDANSETRON 8 MG/1
4 TABLET, FILM COATED ORAL ONCE
Qty: 0 | Refills: 0 | Status: DISCONTINUED | OUTPATIENT
Start: 2019-04-15 | End: 2019-04-30

## 2019-04-12 RX ORDER — FENTANYL CITRATE 50 UG/ML
50 INJECTION INTRAVENOUS
Qty: 0 | Refills: 0 | Status: DISCONTINUED | OUTPATIENT
Start: 2019-04-15 | End: 2019-04-15

## 2019-04-15 ENCOUNTER — OUTPATIENT (OUTPATIENT)
Dept: OUTPATIENT SERVICES | Facility: HOSPITAL | Age: 64
LOS: 1 days | Discharge: HOME | End: 2019-04-15
Payer: MEDICAID

## 2019-04-15 VITALS
RESPIRATION RATE: 16 BRPM | SYSTOLIC BLOOD PRESSURE: 110 MMHG | OXYGEN SATURATION: 95 % | HEIGHT: 64 IN | DIASTOLIC BLOOD PRESSURE: 69 MMHG | HEART RATE: 99 BPM | WEIGHT: 115.08 LBS | TEMPERATURE: 98 F

## 2019-04-15 VITALS
HEART RATE: 100 BPM | SYSTOLIC BLOOD PRESSURE: 109 MMHG | OXYGEN SATURATION: 98 % | DIASTOLIC BLOOD PRESSURE: 67 MMHG | RESPIRATION RATE: 14 BRPM | TEMPERATURE: 99 F

## 2019-04-15 DIAGNOSIS — Z98.891 HISTORY OF UTERINE SCAR FROM PREVIOUS SURGERY: Chronic | ICD-10-CM

## 2019-04-15 DIAGNOSIS — Z98.890 OTHER SPECIFIED POSTPROCEDURAL STATES: Chronic | ICD-10-CM

## 2019-04-15 PROCEDURE — 71045 X-RAY EXAM CHEST 1 VIEW: CPT | Mod: 26

## 2019-04-15 RX ORDER — SODIUM CHLORIDE 9 MG/ML
1000 INJECTION, SOLUTION INTRAVENOUS
Qty: 0 | Refills: 0 | Status: DISCONTINUED | OUTPATIENT
Start: 2019-04-15 | End: 2019-04-30

## 2019-04-15 RX ORDER — LANOLIN ALCOHOL/MO/W.PET/CERES
0 CREAM (GRAM) TOPICAL
Qty: 0 | Refills: 0 | COMMUNITY

## 2019-04-15 RX ORDER — ACETAMINOPHEN 500 MG
650 TABLET ORAL EVERY 6 HOURS
Qty: 0 | Refills: 0 | Status: DISCONTINUED | OUTPATIENT
Start: 2019-04-15 | End: 2019-04-30

## 2019-04-15 RX ORDER — OXYCODONE HYDROCHLORIDE 5 MG/1
5 TABLET ORAL EVERY 4 HOURS
Qty: 0 | Refills: 0 | Status: DISCONTINUED | OUTPATIENT
Start: 2019-04-15 | End: 2019-04-15

## 2019-04-15 RX ORDER — MUPIROCIN 20 MG/G
1 OINTMENT TOPICAL
Qty: 0 | Refills: 0 | COMMUNITY

## 2019-04-15 RX ADMIN — FAMOTIDINE 20 MILLIGRAM(S): 10 INJECTION INTRAVENOUS at 06:59

## 2019-04-15 NOTE — BRIEF OPERATIVE NOTE - NSICDXBRIEFPROCEDURE_GEN_ALL_CORE_FT
PROCEDURES:  Insertion, central venous access device, with fluoroscopic guidance 15-Apr-2019 07:57:08  Ayan Anthony

## 2019-04-18 DIAGNOSIS — C78.00 SECONDARY MALIGNANT NEOPLASM OF UNSPECIFIED LUNG: ICD-10-CM

## 2019-04-18 DIAGNOSIS — C18.9 MALIGNANT NEOPLASM OF COLON, UNSPECIFIED: ICD-10-CM

## 2019-04-18 DIAGNOSIS — C78.7 SECONDARY MALIGNANT NEOPLASM OF LIVER AND INTRAHEPATIC BILE DUCT: ICD-10-CM

## 2019-04-18 DIAGNOSIS — Z87.891 PERSONAL HISTORY OF NICOTINE DEPENDENCE: ICD-10-CM

## 2020-08-18 NOTE — ED ADULT TRIAGE NOTE - WEIGHT IN LBS
"[facility] [[id number]]  Chief Complaint   Patient presents with     RECHECK     Tx follow up     Initial /64 (BP Location: Right arm, Patient Position: Sitting, Cuff Size: Adult Small)   Pulse 82   Ht 4' 9.64\" (146.4 cm)   Wt 85 lb 1.6 oz (38.6 kg)   BMI 18.01 kg/m   Estimated body mass index is 18.01 kg/m  as calculated from the following:    Height as of this encounter: 4' 9.64\" (146.4 cm).    Weight as of this encounter: 85 lb 1.6 oz (38.6 kg).  Medication Reconciliation: complete Mariangel Mclean LPN    "
117

## 2020-09-09 NOTE — ED ADULT NURSE NOTE - NS ED NURSE REPORT GIVEN DT
Patient states he received a call he believes from this office. If so, please call back.    23-Mar-2019 18:13

## 2022-07-05 NOTE — DIETITIAN INITIAL EVALUATION ADULT. - PROBLEM SELECTOR PLAN 1
- repeat blood cultures obtained and following results  - follow am cbc   - c/w Zosyn Q 8 hours  - ID eval if blood cultures +  - Island Pedicle Flap Text: The defect edges were debeveled with a #15 scalpel blade.  Given the location of the defect, shape of the defect and the proximity to free margins an island pedicle advancement flap was deemed most appropriate.  Using a sterile surgical marker, an appropriate advancement flap was drawn incorporating the defect, outlining the appropriate donor tissue and placing the expected incisions within the relaxed skin tension lines where possible.    The area thus outlined was incised deep to adipose tissue with a #15 scalpel blade.  The skin margins were undermined to an appropriate distance in all directions around the primary defect and laterally outward around the island pedicle utilizing iris scissors.  There was minimal undermining beneath the pedicle flap.

## 2022-09-13 NOTE — PATIENT PROFILE ADULT - NSPROPTRIGHTCAREGIVER_GEN_A_NUR
Left a message with the PAR at [facility], to let them know I do not have record for either of these.       ----- Message from Smiley Georges sent at 9/13/2022 12:17 PM CDT -----  Contact: [phone]  [facility] calling about  pt referral they needs mri of cervical and lumbar  They can be reached at [phone]    Thanks,         no

## 2022-11-01 NOTE — ASU PREOP CHECKLIST - BP NONINVASIVE DIASTOLIC (MM HG)
Kena Brito MD   10/30/2022 12:35 PM CDT Back to Top        Normal test results      Kena Brito MD   10/30/2022 12:41 PM CDT Back to Top        Patient has trace leukocytes and few bacteria in urine.  Urine culture shows no growth.  Please check if she has symptoms of UTI         
X-ray results are still pending  
69

## 2023-09-12 NOTE — ED ADULT TRIAGE NOTE - CCCP TRG CHIEF CMPLNT
Benadryl or zyrtec or claritin for the itching  Decadron in office. Start steroid at home tomorrow--10ml daily for 3 more days  Hydrocortisone cream to apply to the rash  Cool compresses to control itch as well.     abd pain/abnormal lab result